# Patient Record
Sex: MALE | Race: BLACK OR AFRICAN AMERICAN | URBAN - METROPOLITAN AREA
[De-identification: names, ages, dates, MRNs, and addresses within clinical notes are randomized per-mention and may not be internally consistent; named-entity substitution may affect disease eponyms.]

---

## 2022-01-19 ENCOUNTER — OFFICE VISIT (OUTPATIENT)
Dept: FAMILY MEDICINE CLINIC | Age: 37
End: 2022-01-19
Payer: COMMERCIAL

## 2022-01-19 VITALS
HEIGHT: 78 IN | SYSTOLIC BLOOD PRESSURE: 121 MMHG | BODY MASS INDEX: 36.45 KG/M2 | HEART RATE: 98 BPM | WEIGHT: 315 LBS | DIASTOLIC BLOOD PRESSURE: 85 MMHG | OXYGEN SATURATION: 98 %

## 2022-01-19 DIAGNOSIS — Z13.29 THYROID DISORDER SCREEN: ICD-10-CM

## 2022-01-19 DIAGNOSIS — E11.9 TYPE 2 DIABETES MELLITUS WITHOUT COMPLICATION, WITHOUT LONG-TERM CURRENT USE OF INSULIN (HCC): Primary | ICD-10-CM

## 2022-01-19 DIAGNOSIS — E66.01 CLASS 3 SEVERE OBESITY WITHOUT SERIOUS COMORBIDITY WITH BODY MASS INDEX (BMI) OF 40.0 TO 44.9 IN ADULT, UNSPECIFIED OBESITY TYPE (HCC): ICD-10-CM

## 2022-01-19 LAB — HBA1C MFR BLD: 13.8 %

## 2022-01-19 PROCEDURE — G8484 FLU IMMUNIZE NO ADMIN: HCPCS | Performed by: FAMILY MEDICINE

## 2022-01-19 PROCEDURE — 99203 OFFICE O/P NEW LOW 30 MIN: CPT | Performed by: FAMILY MEDICINE

## 2022-01-19 PROCEDURE — G8427 DOCREV CUR MEDS BY ELIG CLIN: HCPCS | Performed by: FAMILY MEDICINE

## 2022-01-19 PROCEDURE — G8417 CALC BMI ABV UP PARAM F/U: HCPCS | Performed by: FAMILY MEDICINE

## 2022-01-19 PROCEDURE — 83036 HEMOGLOBIN GLYCOSYLATED A1C: CPT | Performed by: FAMILY MEDICINE

## 2022-01-19 PROCEDURE — 2022F DILAT RTA XM EVC RTNOPTHY: CPT | Performed by: FAMILY MEDICINE

## 2022-01-19 PROCEDURE — 1036F TOBACCO NON-USER: CPT | Performed by: FAMILY MEDICINE

## 2022-01-19 PROCEDURE — 3046F HEMOGLOBIN A1C LEVEL >9.0%: CPT | Performed by: FAMILY MEDICINE

## 2022-01-19 ASSESSMENT — ENCOUNTER SYMPTOMS
SHORTNESS OF BREATH: 0
COUGH: 0
CHEST TIGHTNESS: 0
SORE THROAT: 0
WHEEZING: 0
SINUS PRESSURE: 0
BACK PAIN: 0
ABDOMINAL PAIN: 0
CONSTIPATION: 0
DIARRHEA: 0

## 2022-01-19 ASSESSMENT — PATIENT HEALTH QUESTIONNAIRE - PHQ9
7. TROUBLE CONCENTRATING ON THINGS, SUCH AS READING THE NEWSPAPER OR WATCHING TELEVISION: 0
SUM OF ALL RESPONSES TO PHQ QUESTIONS 1-9: 1
2. FEELING DOWN, DEPRESSED OR HOPELESS: 1
SUM OF ALL RESPONSES TO PHQ QUESTIONS 1-9: 1
5. POOR APPETITE OR OVEREATING: 0
3. TROUBLE FALLING OR STAYING ASLEEP: 0
SUM OF ALL RESPONSES TO PHQ9 QUESTIONS 1 & 2: 1
6. FEELING BAD ABOUT YOURSELF - OR THAT YOU ARE A FAILURE OR HAVE LET YOURSELF OR YOUR FAMILY DOWN: 0
10. IF YOU CHECKED OFF ANY PROBLEMS, HOW DIFFICULT HAVE THESE PROBLEMS MADE IT FOR YOU TO DO YOUR WORK, TAKE CARE OF THINGS AT HOME, OR GET ALONG WITH OTHER PEOPLE: 0
9. THOUGHTS THAT YOU WOULD BE BETTER OFF DEAD, OR OF HURTING YOURSELF: 0
8. MOVING OR SPEAKING SO SLOWLY THAT OTHER PEOPLE COULD HAVE NOTICED. OR THE OPPOSITE, BEING SO FIGETY OR RESTLESS THAT YOU HAVE BEEN MOVING AROUND A LOT MORE THAN USUAL: 0
1. LITTLE INTEREST OR PLEASURE IN DOING THINGS: 0
SUM OF ALL RESPONSES TO PHQ QUESTIONS 1-9: 1
SUM OF ALL RESPONSES TO PHQ QUESTIONS 1-9: 1
4. FEELING TIRED OR HAVING LITTLE ENERGY: 0

## 2022-01-19 NOTE — PROGRESS NOTES
[place]uc Mart  1985  01/19/22    Chief Complaint   Patient presents with    New Patient     Patient here to establish care with PCP           43 yrs old man, with non PMH except new diagnosed with DM, and was started on metformin since last October went to the ED for abscess. Patient never been on medication in the past,  in his chart his A1C 12.4 since 2017, patient didn't know and never told he is diabetic, doing fine and feeling good, no smoking, no alcohols. History reviewed. No pertinent past medical history. History reviewed. No pertinent surgical history. Family History   Problem Relation Age of Onset    Diabetes Maternal Grandmother      Social History     Socioeconomic History    Marital status: Single     Spouse name: Not on file    Number of children: Not on file    Years of education: Not on file    Highest education level: Not on file   Occupational History    Not on file   Tobacco Use    Smoking status: Never Smoker    Smokeless tobacco: Never Used   Substance and Sexual Activity    Alcohol use: Never    Drug use: Never    Sexual activity: Not on file   Other Topics Concern    Not on file   Social History Narrative    Not on file     Social Determinants of Health     Financial Resource Strain:     Difficulty of Paying Living Expenses: Not on file   Food Insecurity:     Worried About Running Out of Food in the Last Year: Not on file    Sonu of Food in the Last Year: Not on file   Transportation Needs:     Lack of Transportation (Medical): Not on file    Lack of Transportation (Non-Medical):  Not on file   Physical Activity:     Days of Exercise per Week: Not on file    Minutes of Exercise per Session: Not on file   Stress:     Feeling of Stress : Not on file   Social Connections:     Frequency of Communication with Friends and Family: Not on file    Frequency of Social Gatherings with Friends and Family: Not on file    Attends Protestant Services: Not on file   Dean Active Member of Clubs or Organizations: Not on file    Attends Club or Organization Meetings: Not on file    Marital Status: Not on file   Intimate Partner Violence:     Fear of Current or Ex-Partner: Not on file    Emotionally Abused: Not on file    Physically Abused: Not on file    Sexually Abused: Not on file   Housing Stability:     Unable to Pay for Housing in the Last Year: Not on file    Number of [place] in the Last Year: Not on file    Unstable Housing in the Last Year: Not on file       Allergies   Allergen Reactions    Bactrim [Sulfamethoxazole-Trimethoprim] Rash     Current Outpatient Medications   Medication Sig Dispense Refill    metFORMIN (GLUCOPHAGE) 500 MG tablet Take 2 tablets by mouth 2 times daily (with meals) 60 tablet 3     No current facility-administered medications for this visit. Review of Systems   Constitutional: Negative for activity change, appetite change, chills, fatigue and fever. HENT: Negative for congestion, postnasal drip, sinus pressure and sore throat. Respiratory: Negative for cough, chest tightness, shortness of breath and wheezing. Cardiovascular: Negative for chest pain and leg swelling. Gastrointestinal: Negative for abdominal pain, constipation and diarrhea. Genitourinary: Negative for dysuria and frequency. Musculoskeletal: Negative for back pain. Skin: Negative for rash. Neurological: Negative for dizziness, weakness and headaches. Psychiatric/Behavioral: Negative for agitation, behavioral problems and sleep disturbance. The patient is not nervous/anxious.           /85 (Site: Left Upper Arm, Position: Sitting, Cuff Size: Large Adult)   Pulse 98   Ht 6' 8\" (2.032 m)   Wt (!) 397 lb 9.6 oz (180.4 kg)   SpO2 98%   BMI 43.68 kg/m²     BP Readings from Last 3 Encounters:   01/19/22 121/85       Wt Readings from Last 3 Encounters:   01/19/22 (!) 397 lb 9.6 oz (180.4 kg)         Physical Exam  Constitutional:       General: He is not in acute distress. Appearance: Normal appearance. He is well-developed. He is obese. He is not diaphoretic. HENT:      Head: Normocephalic and atraumatic. Eyes:      General: No scleral icterus. Cardiovascular:      Rate and Rhythm: Normal rate and regular rhythm. Heart sounds: Normal heart sounds. No murmur heard. Pulmonary:      Effort: Pulmonary effort is normal.      Breath sounds: Normal breath sounds. No wheezing. Musculoskeletal:         General: Normal range of motion. Cervical back: Normal range of motion and neck supple. No rigidity. Right lower leg: No edema. Left lower leg: No edema. Neurological:      General: No focal deficit present. Mental Status: He is alert and oriented to person, place, and time. Cranial Nerves: No cranial nerve deficit. Psychiatric:         Mood and Affect: Mood normal.         Behavior: Behavior normal.         Thought Content: Thought content normal.         Judgment: Judgment normal.         ASSESSMENT/ PLAN:    1. Type 2 diabetes mellitus without complication, without long-term current use of insulin ([facility])  - CBC Auto Differential; Future  - Comprehensive Metabolic Panel, Fasting; Future  - Vitamin B12 & Folate; Future  - [facility] (Ambulatory)  - Lipid Panel; Future  - metFORMIN (GLUCOPHAGE) 500 MG tablet; Take 2 tablets by mouth 2 times daily (with meals)  Dispense: 60 tablet; Refill: 3  - POCT glycosylated hemoglobin (Hb A1C)  - will give the patient just one month to see how the medication and watching diet will affect him, so increase the metformin to 1000mg bid    2. Thyroid disorder screen  - T4, Free; Future  - TSH without Reflex; Future    3.  Class 3 severe obesity without serious comorbidity with body mass index (BMI) of 40.0 to 44.9 in adult, unspecified obesity type ([address]  - encourage wt loss              - All old blood work reviewed with the patient  - Appropriate prescription are addressed. - After visit summery provided. - Questions answered and patient verbalizes understanding.  - Call for any problem, questions, or concerns. Return in about 1 month (around 2/19/2022).

## 2022-01-24 DIAGNOSIS — Z13.29 THYROID DISORDER SCREEN: ICD-10-CM

## 2022-01-24 DIAGNOSIS — E11.9 TYPE 2 DIABETES MELLITUS WITHOUT COMPLICATION, WITHOUT LONG-TERM CURRENT USE OF INSULIN (HCC): ICD-10-CM

## 2022-01-24 LAB
A/G RATIO: 1.7 (ref 1.1–2.2)
ALBUMIN SERPL-MCNC: 4.5 G/DL (ref 3.4–5)
ALP BLD-CCNC: 69 U/L (ref 40–129)
ALT SERPL-CCNC: 54 U/L (ref 10–40)
ANION GAP SERPL CALCULATED.3IONS-SCNC: 13 MMOL/L (ref 3–16)
AST SERPL-CCNC: 24 U/L (ref 15–37)
BASOPHILS ABSOLUTE: 0 K/UL (ref 0–0.2)
BASOPHILS RELATIVE PERCENT: 0.4 %
BILIRUB SERPL-MCNC: 1.4 MG/DL (ref 0–1)
BUN BLDV-MCNC: 10 MG/DL (ref 7–20)
CALCIUM SERPL-MCNC: 9.8 MG/DL (ref 8.3–10.6)
CHLORIDE BLD-SCNC: 95 MMOL/L (ref 99–110)
CHOLESTEROL, TOTAL: 169 MG/DL (ref 0–199)
CO2: 26 MMOL/L (ref 21–32)
CREAT SERPL-MCNC: 0.7 MG/DL (ref 0.9–1.3)
EOSINOPHILS ABSOLUTE: 0 K/UL (ref 0–0.6)
EOSINOPHILS RELATIVE PERCENT: 0.7 %
FOLATE: 13.48 NG/ML (ref 4.78–24.2)
GFR AFRICAN AMERICAN: >60
GFR NON-AFRICAN AMERICAN: >60
GLUCOSE FASTING: 338 MG/DL (ref 70–99)
HCT VFR BLD CALC: 43.5 % (ref 40.5–52.5)
HDLC SERPL-MCNC: 40 MG/DL (ref 40–60)
HEMOGLOBIN: 14.3 G/DL (ref 13.5–17.5)
LDL CHOLESTEROL CALCULATED: 91 MG/DL
LYMPHOCYTES ABSOLUTE: 2.2 K/UL (ref 1–5.1)
LYMPHOCYTES RELATIVE PERCENT: 45.3 %
MCH RBC QN AUTO: 28.1 PG (ref 26–34)
MCHC RBC AUTO-ENTMCNC: 32.9 G/DL (ref 31–36)
MCV RBC AUTO: 85.2 FL (ref 80–100)
MONOCYTES ABSOLUTE: 0.3 K/UL (ref 0–1.3)
MONOCYTES RELATIVE PERCENT: 5.9 %
NEUTROPHILS ABSOLUTE: 2.3 K/UL (ref 1.7–7.7)
NEUTROPHILS RELATIVE PERCENT: 47.7 %
PDW BLD-RTO: 13 % (ref 12.4–15.4)
PLATELET # BLD: 166 K/UL (ref 135–450)
PMV BLD AUTO: 10.1 FL (ref 5–10.5)
POTASSIUM SERPL-SCNC: 4.1 MMOL/L (ref 3.5–5.1)
RBC # BLD: 5.1 M/UL (ref 4.2–5.9)
SODIUM BLD-SCNC: 134 MMOL/L (ref 136–145)
T4 FREE: 1.3 NG/DL (ref 0.9–1.8)
TOTAL PROTEIN: 7.2 G/DL (ref 6.4–8.2)
TRIGL SERPL-MCNC: 191 MG/DL (ref 0–150)
TSH SERPL DL<=0.05 MIU/L-ACNC: 1.73 UIU/ML (ref 0.27–4.2)
VITAMIN B-12: 1096 PG/ML (ref 211–911)
VLDLC SERPL CALC-MCNC: 38 MG/DL
WBC # BLD: 4.9 K/UL (ref 4–11)

## 2022-02-23 ENCOUNTER — OFFICE VISIT (OUTPATIENT)
Dept: FAMILY MEDICINE CLINIC | Age: 37
End: 2022-02-23
Payer: COMMERCIAL

## 2022-02-23 VITALS
DIASTOLIC BLOOD PRESSURE: 88 MMHG | HEART RATE: 86 BPM | WEIGHT: 315 LBS | BODY MASS INDEX: 44.54 KG/M2 | OXYGEN SATURATION: 98 % | SYSTOLIC BLOOD PRESSURE: 128 MMHG

## 2022-02-23 DIAGNOSIS — E66.01 CLASS 3 SEVERE OBESITY WITHOUT SERIOUS COMORBIDITY WITH BODY MASS INDEX (BMI) OF 40.0 TO 44.9 IN ADULT, UNSPECIFIED OBESITY TYPE (HCC): ICD-10-CM

## 2022-02-23 DIAGNOSIS — E11.9 TYPE 2 DIABETES MELLITUS WITHOUT COMPLICATION, WITHOUT LONG-TERM CURRENT USE OF INSULIN (HCC): Primary | ICD-10-CM

## 2022-02-23 DIAGNOSIS — R17 ELEVATED BILIRUBIN: ICD-10-CM

## 2022-02-23 LAB — HBA1C MFR BLD: 11.2 %

## 2022-02-23 PROCEDURE — G8427 DOCREV CUR MEDS BY ELIG CLIN: HCPCS | Performed by: FAMILY MEDICINE

## 2022-02-23 PROCEDURE — 2022F DILAT RTA XM EVC RTNOPTHY: CPT | Performed by: FAMILY MEDICINE

## 2022-02-23 PROCEDURE — 99214 OFFICE O/P EST MOD 30 MIN: CPT | Performed by: FAMILY MEDICINE

## 2022-02-23 PROCEDURE — 83036 HEMOGLOBIN GLYCOSYLATED A1C: CPT | Performed by: FAMILY MEDICINE

## 2022-02-23 PROCEDURE — G8417 CALC BMI ABV UP PARAM F/U: HCPCS | Performed by: FAMILY MEDICINE

## 2022-02-23 PROCEDURE — 3046F HEMOGLOBIN A1C LEVEL >9.0%: CPT | Performed by: FAMILY MEDICINE

## 2022-02-23 PROCEDURE — 1036F TOBACCO NON-USER: CPT | Performed by: FAMILY MEDICINE

## 2022-02-23 PROCEDURE — G8484 FLU IMMUNIZE NO ADMIN: HCPCS | Performed by: FAMILY MEDICINE

## 2022-02-23 RX ORDER — LANCETS 30 GAUGE
EACH MISCELLANEOUS
Qty: 50 EACH | Refills: 5 | Status: SHIPPED | OUTPATIENT
Start: 2022-02-23

## 2022-02-23 RX ORDER — GLUCOSAMINE HCL/CHONDROITIN SU 500-400 MG
CAPSULE ORAL
Qty: 100 STRIP | Refills: 5 | Status: SHIPPED | OUTPATIENT
Start: 2022-02-23

## 2022-02-23 ASSESSMENT — ENCOUNTER SYMPTOMS
DIARRHEA: 0
NAUSEA: 0
ABDOMINAL PAIN: 0
CONSTIPATION: 0
BACK PAIN: 0
VOMITING: 0
SHORTNESS OF BREATH: 0
COUGH: 0

## 2022-02-23 NOTE — PROGRESS NOTES
Intimate Partner Violence:     Fear of Current or Ex-Partner: Not on file    Emotionally Abused: Not on file    Physically Abused: Not on file    Sexually Abused: Not on file   Housing Stability:     Unable to Pay for Housing in the Last Year: Not on file    Number of [place] in the Last Year: Not on file    Unstable Housing in the Last Year: Not on file       Allergies   Allergen Reactions    Bactrim [Sulfamethoxazole-Trimethoprim] Rash     Current Outpatient Medications   Medication Sig Dispense Refill    blood glucose monitor kit and supplies Use 3-4 times daily 1 kit 0    blood glucose monitor strips Test 2-3 times a day & as needed for symptoms of irregular blood glucose. 100 strip 5    Lancets MISC Use one lancet 2  times daily 50 each 5    metFORMIN (GLUCOPHAGE) 500 MG tablet Take 2 tablets by mouth 2 times daily (with meals) 60 tablet 3     No current facility-administered medications for this visit. Review of Systems   Constitutional: Negative for activity change, appetite change, chills, fatigue and fever. HENT: Negative for congestion. Respiratory: Negative for cough and shortness of breath. Cardiovascular: Negative for chest pain and leg swelling. Gastrointestinal: Negative for abdominal pain, constipation, diarrhea, nausea and vomiting. Genitourinary: Negative for dysuria. Musculoskeletal: Negative for back pain. Neurological: Negative for dizziness and headaches. Psychiatric/Behavioral: Negative for agitation and sleep disturbance. The patient is not nervous/anxious.         Lab Results   Component Value Date    WBC 4.9 01/24/2022    HGB 14.3 01/24/2022    HCT 43.5 01/24/2022    MCV 85.2 01/24/2022     01/24/2022     Lab Results   Component Value Date     (L) 01/24/2022    K 4.1 01/24/2022    CL 95 (L) 01/24/2022    CO2 26 01/24/2022    BUN 10 01/24/2022    CREATININE 0.7 (L) 01/24/2022    CALCIUM 9.8 01/24/2022    PROT 7.2 01/24/2022    LABALBU 4.5 01/24/2022    BILITOT 1.4 (H) 01/24/2022    ALKPHOS 69 01/24/2022    AST 24 01/24/2022    ALT 54 (H) 01/24/2022    LABGLOM >60 01/24/2022    GFRAA >60 01/24/2022    AGRATIO 1.7 01/24/2022     Lab Results   Component Value Date    CHOL 169 01/24/2022     Lab Results   Component Value Date    TRIG 191 (H) 01/24/2022     Lab Results   Component Value Date    HDL 40 01/24/2022     Lab Results   Component Value Date    LDLCALC 91 01/24/2022     Lab Results   Component Value Date    LABA1C 13.8 01/19/2022     Lab Results   Component Value Date    TSH 1.73 01/24/2022         /88 (Site: Left Upper Arm, Position: Sitting, Cuff Size: Large Adult)   Pulse 86   Wt (!) 405 lb 6.4 oz (183.9 kg)   SpO2 98%   BMI 44.54 kg/m²     BP Readings from Last 3 Encounters:   02/23/22 128/88   01/19/22 121/85       Wt Readings from Last 3 Encounters:   02/23/22 (!) 405 lb 6.4 oz (183.9 kg)   01/19/22 (!) 397 lb 9.6 oz (180.4 kg)         Physical Exam  Constitutional:       General: He is not in acute distress. Appearance: Normal appearance. He is well-developed. He is obese. He is not diaphoretic. HENT:      Head: Normocephalic and atraumatic. Eyes:      General: No scleral icterus. Cardiovascular:      Rate and Rhythm: Normal rate and regular rhythm. Heart sounds: Normal heart sounds. No murmur heard. Pulmonary:      Effort: Pulmonary effort is normal.      Breath sounds: Normal breath sounds. No wheezing. Musculoskeletal:         General: Normal range of motion. Cervical back: Normal range of motion and neck supple. No rigidity. Right lower leg: No edema. Left lower leg: No edema. Neurological:      General: No focal deficit present. Mental Status: He is alert and oriented to person, place, and time. Cranial Nerves: No cranial nerve deficit. Psychiatric:         Mood and Affect: Mood normal.         Behavior: Behavior normal.         ASSESSMENT/ PLAN:    1.  Type 2 diabetes mellitus without complication, without long-term current use of insulin ([facility]  - his A1C went down from 13.8 to 11.2 with just with the metformin, so will keep the same dose , continue watching diet and recommend exercise. - refuse diabetic education at this time  - blood glucose monitor kit and supplies; Use 3-4 times daily  Dispense: 1 kit; Refill: 0  - blood glucose monitor strips; Test 2-3 times a day & as needed for symptoms of irregular blood glucose. Dispense: 100 strip; Refill: 5  - Lancets MISC; Use one lancet 2  times daily  Dispense: 50 each; Refill: 5  - POCT glycosylated hemoglobin (Hb A1C)    2. Class 3 severe obesity without serious comorbidity with body mass index (BMI) of 40.0 to 44.9 in adult, unspecified obesity type ([facility]  [facility]    3. Elevated bilirubin, will recheck next visit   no previous labs          - All old blood work reviewed with the patient  - Appropriate prescription are addressed. - After visit thierry provided. - Questions answered and patient verbalizes understanding.  - Call for any problem, questions, or concerns. Return in about 2 months (around 4/23/2022).

## 2022-02-24 ENCOUNTER — TELEPHONE (OUTPATIENT)
Dept: BARIATRICS/WEIGHT MGMT | Age: 37
End: 2022-02-24

## 2022-02-24 NOTE — TELEPHONE ENCOUNTER
Called patient for wm referral. Patient will call back.  Verified with [facility] no coverage for bariatrics

## 2022-04-27 ENCOUNTER — OFFICE VISIT (OUTPATIENT)
Dept: FAMILY MEDICINE CLINIC | Age: 37
End: 2022-04-27
Payer: COMMERCIAL

## 2022-04-27 VITALS
SYSTOLIC BLOOD PRESSURE: 124 MMHG | BODY MASS INDEX: 43.63 KG/M2 | HEART RATE: 94 BPM | WEIGHT: 315 LBS | DIASTOLIC BLOOD PRESSURE: 88 MMHG | OXYGEN SATURATION: 97 %

## 2022-04-27 DIAGNOSIS — E11.9 TYPE 2 DIABETES MELLITUS WITHOUT COMPLICATION, WITHOUT LONG-TERM CURRENT USE OF INSULIN (HCC): Primary | ICD-10-CM

## 2022-04-27 LAB — HBA1C MFR BLD: 13.2 %

## 2022-04-27 PROCEDURE — 2022F DILAT RTA XM EVC RTNOPTHY: CPT | Performed by: FAMILY MEDICINE

## 2022-04-27 PROCEDURE — 1036F TOBACCO NON-USER: CPT | Performed by: FAMILY MEDICINE

## 2022-04-27 PROCEDURE — G8417 CALC BMI ABV UP PARAM F/U: HCPCS | Performed by: FAMILY MEDICINE

## 2022-04-27 PROCEDURE — 83036 HEMOGLOBIN GLYCOSYLATED A1C: CPT | Performed by: FAMILY MEDICINE

## 2022-04-27 PROCEDURE — 3046F HEMOGLOBIN A1C LEVEL >9.0%: CPT | Performed by: FAMILY MEDICINE

## 2022-04-27 PROCEDURE — 99214 OFFICE O/P EST MOD 30 MIN: CPT | Performed by: FAMILY MEDICINE

## 2022-04-27 PROCEDURE — G8427 DOCREV CUR MEDS BY ELIG CLIN: HCPCS | Performed by: FAMILY MEDICINE

## 2022-04-27 RX ORDER — METFORMIN HYDROCHLORIDE 500 MG/1
1000 TABLET, EXTENDED RELEASE ORAL 2 TIMES DAILY
Qty: 120 TABLET | Refills: 5 | Status: SHIPPED | OUTPATIENT
Start: 2022-04-27 | End: 2022-10-26 | Stop reason: SDUPTHER

## 2022-04-27 RX ORDER — ORAL SEMAGLUTIDE 3 MG/1
3 TABLET ORAL DAILY
Qty: 30 TABLET | Refills: 5 | Status: SHIPPED | OUTPATIENT
Start: 2022-04-27 | End: 2022-05-24

## 2022-04-27 ASSESSMENT — ENCOUNTER SYMPTOMS
BACK PAIN: 0
COUGH: 0
SHORTNESS OF BREATH: 0

## 2022-04-27 NOTE — PROGRESS NOTES
Kemar Wolfe  1985  04/27/22    Chief Complaint   Patient presents with    Other     Patient here for 2 month F/U for diabetes           Patient here for 2 months f/u regarding DM, last visit the A1C went down from 13.8 to 11.2, patient doing fine , and lost wt, feeling better, he is taking the 500mg metformin 2 tabs bid. History reviewed. No pertinent past medical history. History reviewed. No pertinent surgical history. Family History   Problem Relation Age of Onset    Diabetes Maternal Grandmother      Social History     Socioeconomic History    Marital status: Single     Spouse name: Not on file    Number of children: Not on file    Years of education: Not on file    Highest education level: Not on file   Occupational History    Not on file   Tobacco Use    Smoking status: Never Smoker    Smokeless tobacco: Never Used   Substance and Sexual Activity    Alcohol use: Never    Drug use: Never    Sexual activity: Not on file   Other Topics Concern    Not on file   Social History Narrative    Not on file     Social Determinants of Health     Financial Resource Strain:     Difficulty of Paying Living Expenses: Not on file   Food Insecurity:     Worried About Running Out of Food in the Last Year: Not on file    Sonu of Food in the Last Year: Not on file   Transportation Needs:     Lack of Transportation (Medical): Not on file    Lack of Transportation (Non-Medical):  Not on file   Physical Activity:     Days of Exercise per Week: Not on file    Minutes of Exercise per Session: Not on file   Stress:     Feeling of Stress : Not on file   Social Connections:     Frequency of Communication with Friends and Family: Not on file    Frequency of Social Gatherings with Friends and Family: Not on file    Attends Bahai Services: Not on file    Active Member of Clubs or Organizations: Not on file    Attends Club or Organization Meetings: Not on file    Marital Status: Not on file Intimate Partner Violence:     Fear of Current or Ex-Partner: Not on file    Emotionally Abused: Not on file    Physically Abused: Not on file    Sexually Abused: Not on file   Housing Stability:     Unable to Pay for Housing in the Last Year: Not on file    Number of [place] in the Last Year: Not on file    Unstable Housing in the Last Year: Not on file       Allergies   Allergen Reactions    Bactrim [Sulfamethoxazole-Trimethoprim] Rash     Current Outpatient Medications   Medication Sig Dispense Refill    Semaglutide (RYBELSUS) 3 MG TABS Take 3 mg by mouth daily 30 tablet 5    metFORMIN (GLUCOPHAGE-XR) 500 MG extended release tablet Take 2 tablets by mouth 2 times daily 120 tablet 5    blood glucose monitor kit and supplies Use 3-4 times daily 1 kit 0    blood glucose monitor strips Test 2-3 times a day & as needed for symptoms of irregular blood glucose. 100 strip 5    Lancets MISC Use one lancet 2  times daily 50 each 5     No current facility-administered medications for this visit. Review of Systems   Constitutional: Negative for activity change, appetite change, chills, fatigue and fever. HENT: Negative for congestion. Respiratory: Negative for cough and shortness of breath. Cardiovascular: Negative for chest pain and leg swelling. Genitourinary: Negative for dysuria. Musculoskeletal: Negative for back pain. Neurological: Negative for dizziness and headaches. Psychiatric/Behavioral: Negative for agitation and sleep disturbance. The patient is not nervous/anxious.         Lab Results   Component Value Date    WBC 4.9 01/24/2022    HGB 14.3 01/24/2022    HCT 43.5 01/24/2022    MCV 85.2 01/24/2022     01/24/2022     Lab Results   Component Value Date     (L) 01/24/2022    K 4.1 01/24/2022    CL 95 (L) 01/24/2022    CO2 26 01/24/2022    BUN 10 01/24/2022    CREATININE 0.7 (L) 01/24/2022    CALCIUM 9.8 01/24/2022    PROT 7.2 01/24/2022    LABALBU 4.5 01/24/2022 BILITOT 1.4 (H) 01/24/2022    ALKPHOS 69 01/24/2022    AST 24 01/24/2022    ALT 54 (H) 01/24/2022    LABGLOM >60 01/24/2022    GFRAA >60 01/24/2022    AGRATIO 1.7 01/24/2022     Lab Results   Component Value Date    CHOL 169 01/24/2022     Lab Results   Component Value Date    TRIG 191 (H) 01/24/2022     Lab Results   Component Value Date    HDL 40 01/24/2022     Lab Results   Component Value Date    LDLCALC 91 01/24/2022     Lab Results   Component Value Date    LABA1C 13.2 04/27/2022     Lab Results   Component Value Date    TSH 1.73 01/24/2022         /88 (Site: Left Upper Arm, Position: Sitting, Cuff Size: Large Adult)   Pulse 94   Wt (!) 397 lb 3.2 oz (180.2 kg)   SpO2 97%   BMI 43.63 kg/m²     BP Readings from Last 3 Encounters:   04/27/22 124/88   02/23/22 128/88   01/19/22 121/85       Wt Readings from Last 3 Encounters:   04/27/22 (!) 397 lb 3.2 oz (180.2 kg)   02/23/22 (!) 405 lb 6.4 oz (183.9 kg)   01/19/22 (!) 397 lb 9.6 oz (180.4 kg)         Physical Exam  Constitutional:       General: He is not in acute distress. Appearance: Normal appearance. He is well-developed. He is obese. He is not diaphoretic. HENT:      Head: Normocephalic and atraumatic. Eyes:      General: No scleral icterus. Cardiovascular:      Rate and Rhythm: Normal rate and regular rhythm. Heart sounds: Normal heart sounds. No murmur heard. Pulmonary:      Effort: Pulmonary effort is normal.      Breath sounds: Normal breath sounds. No wheezing. Musculoskeletal:         General: Normal range of motion. Cervical back: Normal range of motion and neck supple. No rigidity. Right lower leg: No edema. Left lower leg: No edema. Neurological:      General: No focal deficit present. Mental Status: He is alert and oriented to person, place, and time. Cranial Nerves: No cranial nerve deficit.    Psychiatric:         Mood and Affect: Mood normal.         Behavior: Behavior normal. ASSESSMENT/ PLAN:    1. Type 2 diabetes mellitus without complication, without long-term current use of insulin (H[facility])  - POCT glycosylated hemoglobin (Hb A1C), went up, so add :  - Semaglutide (RYBELSUS) 3 MG TABS; Take 3 mg by mouth daily  Dispense: 30 tablet; Refill: 5  - metFORMIN (GLUCOPHAGE-XR) 500 MG extended release tablet; Take 2 tablets by mouth 2 times daily  Dispense: 120 tablet; Refill: 5  - also we change the metformin to long acting one            - All old blood work reviewed with the patient  - Appropriate prescription are addressed. - After visit summery provided. - Questions answered and patient verbalizes understanding.  - Call for any problem, questions, or concerns. Return in about 1 month (around 5/27/2022).

## 2022-05-24 ENCOUNTER — TELEPHONE (OUTPATIENT)
Dept: FAMILY MEDICINE CLINIC | Age: 37
End: 2022-05-24

## 2022-05-24 RX ORDER — GLIMEPIRIDE 2 MG/1
2 TABLET ORAL
Qty: 30 TABLET | Refills: 1 | Status: ON HOLD | OUTPATIENT
Start: 2022-05-24 | End: 2022-07-01 | Stop reason: HOSPADM

## 2022-05-24 NOTE — TELEPHONE ENCOUNTER
Patient called and stated that his insurance will not cover the Rybelsus is there something else that can be called in?  Please advise

## 2022-05-24 NOTE — TELEPHONE ENCOUNTER
History noted and chart reviewed. Patient's PCP is unavailable for several weeks. Last visit was in April and hemoglobin A1c then was 13.2. Rybelsus was ordered but apparently patient was never able to pick it up. Rybelsus was discontinued in the chart. I ordered [place]. There is no data coming back from epic about coverage information for this patient's insurance. The prescription sent to [facility] was noted to call if not covered. Patient should try to go  the new medication and be rescheduled in about 1 month for follow-up on this new medication. If it is not covered he needs to call us immediately as the likely neck step will be to put him on insulin for diabetic control.

## 2022-05-24 NOTE — TELEPHONE ENCOUNTER
We will try some glimepiride until we can see him in the office and decide if that is helpful. He is rescheduled for a month from now.

## 2022-06-27 ENCOUNTER — HOSPITAL ENCOUNTER (INPATIENT)
Age: 37
LOS: 4 days | Discharge: HOME | DRG: 617 | End: 2022-07-01
Attending: EMERGENCY MEDICINE | Admitting: HOSPITALIST
Payer: COMMERCIAL

## 2022-06-27 ENCOUNTER — APPOINTMENT (OUTPATIENT)
Dept: GENERAL RADIOLOGY | Age: 37
DRG: 617 | End: 2022-06-27
Payer: COMMERCIAL

## 2022-06-27 ENCOUNTER — OFFICE VISIT (OUTPATIENT)
Dept: FAMILY MEDICINE CLINIC | Age: 37
End: 2022-06-27
Payer: COMMERCIAL

## 2022-06-27 VITALS
OXYGEN SATURATION: 97 % | WEIGHT: 315 LBS | HEART RATE: 94 BPM | SYSTOLIC BLOOD PRESSURE: 138 MMHG | BODY MASS INDEX: 44.1 KG/M2 | DIASTOLIC BLOOD PRESSURE: 86 MMHG

## 2022-06-27 DIAGNOSIS — M86.9 OSTEOMYELITIS OF TOE OF RIGHT FOOT (HCC): Primary | ICD-10-CM

## 2022-06-27 DIAGNOSIS — E11.628 DIABETIC INFECTION OF RIGHT FOOT (HCC): ICD-10-CM

## 2022-06-27 DIAGNOSIS — L03.031 CELLULITIS OF TOE OF RIGHT FOOT: ICD-10-CM

## 2022-06-27 DIAGNOSIS — I96 GANGRENE (HCC): ICD-10-CM

## 2022-06-27 DIAGNOSIS — L08.9 DIABETIC INFECTION OF RIGHT FOOT (HCC): ICD-10-CM

## 2022-06-27 DIAGNOSIS — E11.65 UNCONTROLLED TYPE 2 DIABETES MELLITUS WITH HYPERGLYCEMIA (HCC): Primary | ICD-10-CM

## 2022-06-27 PROBLEM — M86.10 ACUTE OSTEOMYELITIS (HCC): Status: ACTIVE | Noted: 2022-06-27

## 2022-06-27 LAB
ALBUMIN SERPL-MCNC: 4.1 GM/DL (ref 3.4–5)
ALP BLD-CCNC: 60 IU/L (ref 40–129)
ALT SERPL-CCNC: 48 U/L (ref 10–40)
ANION GAP SERPL CALCULATED.3IONS-SCNC: 9 MMOL/L (ref 4–16)
AST SERPL-CCNC: 27 IU/L (ref 15–37)
BACTERIA: ABNORMAL /HPF
BACTERIA: NEGATIVE /HPF
BASOPHILS ABSOLUTE: 0 K/CU MM
BASOPHILS RELATIVE PERCENT: 0.4 % (ref 0–1)
BILIRUB SERPL-MCNC: 0.6 MG/DL (ref 0–1)
BILIRUBIN URINE: NEGATIVE MG/DL
BILIRUBIN URINE: NEGATIVE MG/DL
BLOOD, URINE: NEGATIVE
BLOOD, URINE: NEGATIVE
BUN BLDV-MCNC: 12 MG/DL (ref 6–23)
CALCIUM SERPL-MCNC: 9.2 MG/DL (ref 8.3–10.6)
CHLORIDE BLD-SCNC: 100 MMOL/L (ref 99–110)
CLARITY: CLEAR
CLARITY: CLEAR
CO2: 26 MMOL/L (ref 21–32)
COLOR: COLORLESS
COLOR: YELLOW
CREAT SERPL-MCNC: 0.7 MG/DL (ref 0.9–1.3)
CREATININE URINE POCT: ABNORMAL
DIFFERENTIAL TYPE: ABNORMAL
EOSINOPHILS ABSOLUTE: 0 K/CU MM
EOSINOPHILS RELATIVE PERCENT: 0.6 % (ref 0–3)
ERYTHROCYTE SEDIMENTATION RATE: 41 MM/HR (ref 0–15)
GFR AFRICAN AMERICAN: >60 ML/MIN/1.73M2
GFR NON-AFRICAN AMERICAN: >60 ML/MIN/1.73M2
GLUCOSE BLD-MCNC: 157 MG/DL (ref 70–99)
GLUCOSE BLD-MCNC: 195 MG/DL (ref 70–99)
GLUCOSE BLD-MCNC: 237 MG/DL (ref 70–99)
GLUCOSE BLD-MCNC: 244 MG/DL (ref 70–99)
GLUCOSE, URINE: 250 MG/DL
GLUCOSE, URINE: NEGATIVE MG/DL
HBA1C MFR BLD: 9.9 %
HCT VFR BLD CALC: 43.4 % (ref 42–52)
HEMOGLOBIN: 13.9 GM/DL (ref 13.5–18)
HIGH SENSITIVE C-REACTIVE PROTEIN: 2.8 MG/L
IMMATURE NEUTROPHIL %: 0.2 % (ref 0–0.43)
KETONES, URINE: NEGATIVE MG/DL
KETONES, URINE: NEGATIVE MG/DL
LACTIC ACID, SEPSIS: 1.3 MMOL/L (ref 0.5–1.9)
LEUKOCYTE ESTERASE, URINE: NEGATIVE
LEUKOCYTE ESTERASE, URINE: NEGATIVE
LYMPHOCYTES ABSOLUTE: 2.1 K/CU MM
LYMPHOCYTES RELATIVE PERCENT: 40.4 % (ref 24–44)
MCH RBC QN AUTO: 28.8 PG (ref 27–31)
MCHC RBC AUTO-ENTMCNC: 32 % (ref 32–36)
MCV RBC AUTO: 89.9 FL (ref 78–100)
MICROALBUMIN/CREAT 24H UR: ABNORMAL MG/G{CREAT}
MICROALBUMIN/CREAT UR-RTO: ABNORMAL
MONOCYTES ABSOLUTE: 0.4 K/CU MM
MONOCYTES RELATIVE PERCENT: 7.5 % (ref 0–4)
MUCUS: ABNORMAL HPF
NITRITE URINE, QUANTITATIVE: NEGATIVE
NITRITE URINE, QUANTITATIVE: NEGATIVE
NUCLEATED RBC %: 0 %
PDW BLD-RTO: 11.9 % (ref 11.7–14.9)
PH, URINE: 5.5 (ref 5–8)
PH, URINE: 6 (ref 5–8)
PLATELET # BLD: 166 K/CU MM (ref 140–440)
PMV BLD AUTO: 12.4 FL (ref 7.5–11.1)
POTASSIUM SERPL-SCNC: 4.3 MMOL/L (ref 3.5–5.1)
PROCALCITONIN: 0.04
PROTEIN UA: NEGATIVE MG/DL
PROTEIN UA: NEGATIVE MG/DL
RBC # BLD: 4.83 M/CU MM (ref 4.6–6.2)
RBC URINE: ABNORMAL /HPF (ref 0–3)
RBC URINE: ABNORMAL /HPF (ref 0–3)
SEGMENTED NEUTROPHILS ABSOLUTE COUNT: 2.7 K/CU MM
SEGMENTED NEUTROPHILS RELATIVE PERCENT: 50.9 % (ref 36–66)
SODIUM BLD-SCNC: 135 MMOL/L (ref 135–145)
SPECIFIC GRAVITY UA: 1.01 (ref 1–1.03)
SPECIFIC GRAVITY UA: <1.005 (ref 1–1.03)
SQUAMOUS EPITHELIAL: <1 /HPF
TOTAL IMMATURE NEUTOROPHIL: 0.01 K/CU MM
TOTAL NUCLEATED RBC: 0 K/CU MM
TOTAL PROTEIN: 7.3 GM/DL (ref 6.4–8.2)
TOTAL RETICULOCYTE COUNT: 0.12 K/CU MM
TRICHOMONAS: ABNORMAL /HPF
TRICHOMONAS: ABNORMAL /HPF
UROBILINOGEN, URINE: 0.2 MG/DL (ref 0.2–1)
UROBILINOGEN, URINE: 0.2 MG/DL (ref 0.2–1)
WBC # BLD: 5.2 K/CU MM (ref 4–10.5)
WBC UA: 1 /HPF (ref 0–2)
WBC UA: <1 /HPF (ref 0–2)

## 2022-06-27 PROCEDURE — 2580000003 HC RX 258: Performed by: NURSE PRACTITIONER

## 2022-06-27 PROCEDURE — 3046F HEMOGLOBIN A1C LEVEL >9.0%: CPT | Performed by: FAMILY MEDICINE

## 2022-06-27 PROCEDURE — 6370000000 HC RX 637 (ALT 250 FOR IP): Performed by: NURSE PRACTITIONER

## 2022-06-27 PROCEDURE — 1036F TOBACCO NON-USER: CPT | Performed by: FAMILY MEDICINE

## 2022-06-27 PROCEDURE — 85025 COMPLETE CBC W/AUTO DIFF WBC: CPT

## 2022-06-27 PROCEDURE — G8427 DOCREV CUR MEDS BY ELIG CLIN: HCPCS | Performed by: FAMILY MEDICINE

## 2022-06-27 PROCEDURE — 81001 URINALYSIS AUTO W/SCOPE: CPT

## 2022-06-27 PROCEDURE — 80053 COMPREHEN METABOLIC PANEL: CPT

## 2022-06-27 PROCEDURE — 99285 EMERGENCY DEPT VISIT HI MDM: CPT

## 2022-06-27 PROCEDURE — 6360000002 HC RX W HCPCS: Performed by: NURSE PRACTITIONER

## 2022-06-27 PROCEDURE — 2022F DILAT RTA XM EVC RTNOPTHY: CPT | Performed by: FAMILY MEDICINE

## 2022-06-27 PROCEDURE — 87086 URINE CULTURE/COLONY COUNT: CPT

## 2022-06-27 PROCEDURE — 85652 RBC SED RATE AUTOMATED: CPT

## 2022-06-27 PROCEDURE — 99214 OFFICE O/P EST MOD 30 MIN: CPT | Performed by: FAMILY MEDICINE

## 2022-06-27 PROCEDURE — 73630 X-RAY EXAM OF FOOT: CPT

## 2022-06-27 PROCEDURE — 94761 N-INVAS EAR/PLS OXIMETRY MLT: CPT

## 2022-06-27 PROCEDURE — 1200000000 HC SEMI PRIVATE

## 2022-06-27 PROCEDURE — 83036 HEMOGLOBIN GLYCOSYLATED A1C: CPT | Performed by: FAMILY MEDICINE

## 2022-06-27 PROCEDURE — G8417 CALC BMI ABV UP PARAM F/U: HCPCS | Performed by: FAMILY MEDICINE

## 2022-06-27 PROCEDURE — 6370000000 HC RX 637 (ALT 250 FOR IP): Performed by: INTERNAL MEDICINE

## 2022-06-27 PROCEDURE — 82962 GLUCOSE BLOOD TEST: CPT

## 2022-06-27 PROCEDURE — 84145 PROCALCITONIN (PCT): CPT

## 2022-06-27 PROCEDURE — 87040 BLOOD CULTURE FOR BACTERIA: CPT

## 2022-06-27 PROCEDURE — 83605 ASSAY OF LACTIC ACID: CPT

## 2022-06-27 PROCEDURE — 82044 UR ALBUMIN SEMIQUANTITATIVE: CPT | Performed by: FAMILY MEDICINE

## 2022-06-27 PROCEDURE — 86141 C-REACTIVE PROTEIN HS: CPT

## 2022-06-27 RX ORDER — DEXTROSE MONOHYDRATE 50 MG/ML
100 INJECTION, SOLUTION INTRAVENOUS PRN
Status: DISCONTINUED | OUTPATIENT
Start: 2022-06-27 | End: 2022-07-01 | Stop reason: HOSPADM

## 2022-06-27 RX ORDER — ONDANSETRON 2 MG/ML
4 INJECTION INTRAMUSCULAR; INTRAVENOUS EVERY 6 HOURS PRN
Status: DISCONTINUED | OUTPATIENT
Start: 2022-06-27 | End: 2022-07-01 | Stop reason: HOSPADM

## 2022-06-27 RX ORDER — INSULIN LISPRO 100 [IU]/ML
0-6 INJECTION, SOLUTION INTRAVENOUS; SUBCUTANEOUS
Status: DISCONTINUED | OUTPATIENT
Start: 2022-06-27 | End: 2022-07-01 | Stop reason: HOSPADM

## 2022-06-27 RX ORDER — INSULIN GLARGINE 100 [IU]/ML
0.15 INJECTION, SOLUTION SUBCUTANEOUS NIGHTLY
Status: DISCONTINUED | OUTPATIENT
Start: 2022-06-27 | End: 2022-06-27

## 2022-06-27 RX ORDER — INSULIN GLARGINE 100 [IU]/ML
30 INJECTION, SOLUTION SUBCUTANEOUS NIGHTLY
Status: DISCONTINUED | OUTPATIENT
Start: 2022-06-27 | End: 2022-06-30

## 2022-06-27 RX ORDER — INSULIN LISPRO 100 [IU]/ML
0.08 INJECTION, SOLUTION INTRAVENOUS; SUBCUTANEOUS
Status: DISCONTINUED | OUTPATIENT
Start: 2022-06-27 | End: 2022-06-27

## 2022-06-27 RX ORDER — SODIUM CHLORIDE 0.9 % (FLUSH) 0.9 %
5-40 SYRINGE (ML) INJECTION PRN
Status: DISCONTINUED | OUTPATIENT
Start: 2022-06-27 | End: 2022-07-01 | Stop reason: HOSPADM

## 2022-06-27 RX ORDER — INSULIN LISPRO 100 [IU]/ML
0-3 INJECTION, SOLUTION INTRAVENOUS; SUBCUTANEOUS NIGHTLY
Status: DISCONTINUED | OUTPATIENT
Start: 2022-06-27 | End: 2022-06-27

## 2022-06-27 RX ORDER — ACETAMINOPHEN 325 MG/1
650 TABLET ORAL EVERY 6 HOURS PRN
Status: DISCONTINUED | OUTPATIENT
Start: 2022-06-27 | End: 2022-07-01 | Stop reason: HOSPADM

## 2022-06-27 RX ORDER — POLYETHYLENE GLYCOL 3350 17 G/17G
17 POWDER, FOR SOLUTION ORAL DAILY PRN
Status: DISCONTINUED | OUTPATIENT
Start: 2022-06-27 | End: 2022-07-01 | Stop reason: HOSPADM

## 2022-06-27 RX ORDER — ENOXAPARIN SODIUM 100 MG/ML
40 INJECTION SUBCUTANEOUS 2 TIMES DAILY
Status: DISCONTINUED | OUTPATIENT
Start: 2022-06-27 | End: 2022-06-28

## 2022-06-27 RX ORDER — SODIUM CHLORIDE 9 MG/ML
INJECTION, SOLUTION INTRAVENOUS PRN
Status: DISCONTINUED | OUTPATIENT
Start: 2022-06-27 | End: 2022-07-01 | Stop reason: HOSPADM

## 2022-06-27 RX ORDER — SODIUM CHLORIDE 0.9 % (FLUSH) 0.9 %
5-40 SYRINGE (ML) INJECTION EVERY 12 HOURS SCHEDULED
Status: DISCONTINUED | OUTPATIENT
Start: 2022-06-27 | End: 2022-07-01 | Stop reason: HOSPADM

## 2022-06-27 RX ORDER — INSULIN LISPRO 100 [IU]/ML
0-3 INJECTION, SOLUTION INTRAVENOUS; SUBCUTANEOUS
Status: DISCONTINUED | OUTPATIENT
Start: 2022-06-28 | End: 2022-07-01

## 2022-06-27 RX ORDER — ONDANSETRON 4 MG/1
4 TABLET, ORALLY DISINTEGRATING ORAL EVERY 8 HOURS PRN
Status: DISCONTINUED | OUTPATIENT
Start: 2022-06-27 | End: 2022-07-01 | Stop reason: HOSPADM

## 2022-06-27 RX ORDER — INSULIN LISPRO 100 [IU]/ML
0-6 INJECTION, SOLUTION INTRAVENOUS; SUBCUTANEOUS
Status: DISCONTINUED | OUTPATIENT
Start: 2022-06-28 | End: 2022-06-27

## 2022-06-27 RX ADMIN — VANCOMYCIN HYDROCHLORIDE 1750 MG: 1 INJECTION, POWDER, LYOPHILIZED, FOR SOLUTION INTRAVENOUS at 21:22

## 2022-06-27 RX ADMIN — INSULIN LISPRO 1 UNITS: 100 INJECTION, SOLUTION INTRAVENOUS; SUBCUTANEOUS at 18:35

## 2022-06-27 RX ADMIN — ENOXAPARIN SODIUM 40 MG: 100 INJECTION SUBCUTANEOUS at 21:23

## 2022-06-27 RX ADMIN — CEFEPIME 2000 MG: 2 INJECTION, POWDER, FOR SOLUTION INTRAVENOUS at 15:05

## 2022-06-27 RX ADMIN — INSULIN GLARGINE 30 UNITS: 100 INJECTION, SOLUTION SUBCUTANEOUS at 22:00

## 2022-06-27 ASSESSMENT — ENCOUNTER SYMPTOMS
SORE THROAT: 0
CONSTIPATION: 0
COUGH: 0
VOMITING: 0
ABDOMINAL PAIN: 0
NAUSEA: 0
EYES NEGATIVE: 1
SHORTNESS OF BREATH: 0

## 2022-06-27 NOTE — PROGRESS NOTES
Pharmacist Review and Automatic Dose Adjustment of Prophylactic Enoxaparin    *Review reason for admission/hospital problem list*    The reviewing pharmacist has made an adjustment to the ordered enoxaparin dose or converted to UFH per the approved [facility] protocol and table as identified below. Dallas Garcia is a 42 y.o. male. Recent Labs     06/27/22  1236   CREATININE 0.7*       Estimated Creatinine Clearance: 261 mL/min (A) (based on SCr of 0.7 mg/dL (L)). Recent Labs     06/27/22  1236   HGB 13.9   HCT 43.4        No results for input(s): INR in the last 72 hours. Height:   Ht Readings from Last 1 Encounters:   06/27/22 6' 8\" (2.032 m)     Weight:  Wt Readings from Last 1 Encounters:   06/27/22 (!) 401 lb (181.9 kg)               Plan: Based upon the patient's weight and renal function, the ordered enoxaparin dose of 40 mg daily has been changed/converted to 40 mg twice daily.       Thank you,  Cherise Peterson, [id number] [facility]  6/27/2022, 5:25 PM

## 2022-06-27 NOTE — H&P
V2.0  History and Physical      Name:  Pepe Hagan /Age/Sex: 1985  (42 y.o. male)   MRN & CSN:  [id number] & [id number] Encounter Date/Time: 2022 2:36 PM EDT   Location:  ED[facility]/ED-19 PCP: Esmeralda Epley, MD       Hospital Day: 1    Assessment and Plan:   Pepe Hagan is a 42 y.o. male with a pmh as noted below presents with cellulitis right great toe with concern for osteomyelitis    Cellulitis  Osteomylitis of Right 2nd Toe                            Admit to inpatient             X-ray right foot with suspected arthritis/osteomyelitis of the proximal joint of the second toe with lateral  dislocation. Blood cultures x2, UA, urine culture, check MRSA swab nares, add on ESR and CRP  Started on vancomycin in ED, add on cefepime  Consult general surgery for evaluation of cellulitis osteomyelitis right great toe   MRI right foot ordered   Consult wound care for chronic right lower extremity wound infection              MRSA swab nares, add on procalcitonin and lactate             Patient afebrile, no evidence of leukocytosis. Trend labs     Insulin dependent type 2 diabetes   -, convert to weight-based long-acting glargine plus sliding scale with hypoglycemia  protocol   -Hold home oral antihyperglycemics, check hemoglobin A1c    Chronic Conditions: continue home medication as ordered       All testing  and results reviewed with patient . All questions answered. Patient and family voiced understanding    This patient was seen and examined in conjunction with Dr. Iveth Goldsmith. He/She was agreeable with the plan and management as dictated above.     Disposition:   Expected Disposition: Home  Estimated D/C: 3 days     Diet No diet orders on file   GI Prophylaxis  [x] PPI,  [] H2 Blocker,  [] Carafate,  [] Diet/Tube Feeds   DVT Prophylaxis [x] Lovenox, []  Heparin, [] SCDs, [] Ambulation,  [] NOAC   Code Status No Order   Surrogate Decision Maker/ POA          History from:     patient, electronic medical (GLUCOPHAGE-XR) 500 MG extended release tablet Take 2 tablets by mouth 2 times daily 4/27/22   Navin Queen MD   blood glucose monitor kit and supplies Use 3-4 times daily 2/23/22   Navin Queen MD   blood glucose monitor strips Test 2-3 times a day & as needed for symptoms of irregular blood glucose. 2/23/22   Navin Queen MD   Lancets MISC Use one lancet 2  times daily 2/23/22   Navin Queen MD       Physical Exam: Need 8 Elements   Physical Exam  Vitals and nursing note reviewed. Constitutional:       General: He is not in acute distress. Appearance: Normal appearance. HENT:      Head: Normocephalic. Nose: Nose normal.      Mouth/Throat:      Pharynx: Oropharynx is clear. Eyes:      Pupils: Pupils are equal, round, and reactive to light. Cardiovascular:      Rate and Rhythm: Normal rate and regular rhythm. Pulses:           Dorsalis pedis pulses are 2+ on the right side. Posterior tibial pulses are 2+ on the right side. Pulmonary:      Effort: Pulmonary effort is normal. No respiratory distress. Breath sounds: No wheezing or rhonchi. Abdominal:      General: Abdomen is flat. Bowel sounds are normal. There is no distension. Musculoskeletal:         General: Normal range of motion. Cervical back: Normal range of motion. Feet:    Feet:      Right foot:      Skin integrity: Ulcer, erythema and warmth present. Comments: 3cm ulceration 2nd   Skin:     General: Skin is warm and dry. Capillary Refill: Capillary refill takes less than 2 seconds. Neurological:      General: No focal deficit present. Mental Status: He is alert and oriented to person, place, and time. Psychiatric:         Mood and Affect: Mood normal.            Past Medical History:   PMHx NODDM  PSHX:  has no past surgical history  Allergies:    Allergies   Allergen Reactions    Bactrim [Sulfamethoxazole-Trimethoprim] Rash     Fam HX:  family history includes Diabetes in his maternal grandmother. Soc HX:   Social History     Socioeconomic History    Marital status: Single     Spouse name: None    Number of children: None    Years of education: None    Highest education level: None   Occupational History    None   Tobacco Use    Smoking status: Never Smoker    Smokeless tobacco: Never Used   Substance and Sexual Activity    Alcohol use: Never    Drug use: Never    Sexual activity: None   Other Topics Concern    None   Social History Narrative    None     Social Determinants of Health     Financial Resource Strain:     Difficulty of Paying Living Expenses: Not on file   Food Insecurity:     Worried About Running Out of Food in the Last Year: Not on file    Sonu of Food in the Last Year: Not on file   Transportation Needs:     Lack of Transportation (Medical): Not on file    Lack of Transportation (Non-Medical):  Not on file   Physical Activity:     Days of Exercise per Week: Not on file    Minutes of Exercise per Session: Not on file   Stress:     Feeling of Stress : Not on file   Social Connections:     Frequency of Communication with Friends and Family: Not on file    Frequency of Social Gatherings with Friends and Family: Not on file    Attends Hinduism Services: Not on file    Active Member of [address] [place] [address] or Organizations: Not on file    Attends Club or Organization Meetings: Not on file    Marital Status: Not on file   Intimate Partner Violence:     Fear of Current or Ex-Partner: Not on file    Emotionally Abused: Not on file    Physically Abused: Not on file    Sexually Abused: Not on file   Housing Stability:     Unable to Pay for Housing in the Last Year: Not on file    Number of [place] in the Last Year: Not on file    Unstable Housing in the Last Year: Not on file       Medications:   Medications:    vancomycin  2,000 mg IntraVENous Once    cefepime  2,000 mg IntraVENous Q8H      Infusions:   PRN Meds:      Labs      CBC:   Recent Labs 06/27/22  1236   WBC 5.2   HGB 13.9        BMP:    Recent Labs     06/27/22  1236      K 4.3      CO2 26   BUN 12   CREATININE 0.7*   GLUCOSE 244*     Hepatic:   Recent Labs     06/27/22  1236   AST 27   ALT 48*   BILITOT 0.6   ALKPHOS 60     Lipids:   Lab Results   Component Value Date    CHOL 169 01/24/2022    HDL 40 01/24/2022    TRIG 191 01/24/2022     Hemoglobin A1C:   Lab Results   Component Value Date    LABA1C 9.9 06/27/2022     TSH:   Lab Results   Component Value Date    TSH 1.73 01/24/2022     Troponin: No results found for: TROPONINT  Lactic Acid: No results for input(s): LACTA in the last 72 hours. BNP: No results for input(s): PROBNP in the last 72 hours. UA:  Lab Results   Component Value Date    NITRU NEGATIVE 06/27/2022    COLORU YELLOW 06/27/2022    WBCUA 1 06/27/2022    RBCUA NONE SEEN 06/27/2022    MUCUS RARE 06/27/2022    TRICHOMONAS NONE SEEN 06/27/2022    BACTERIA NEGATIVE 06/27/2022    CLARITYU CLEAR 06/27/2022    SPECGRAV 1.010 06/27/2022    LEUKOCYTESUR NEGATIVE 06/27/2022    UROBILINOGEN 0.2 06/27/2022    BILIRUBINUR NEGATIVE 06/27/2022    BLOODU NEGATIVE 06/27/2022    KETUA NEGATIVE 06/27/2022     Urine Cultures:   Lab Results   Component Value Date    LABURIN 200mg/dL 06/27/2022     Blood Cultures: No results found for: BC  No results found for: BLOODCULT2  Organism: No results found for: ORG    Imaging/Diagnostics Last 24 Hours   XR FOOT RIGHT (MIN 3 VIEWS)    Result Date: 6/27/2022  EXAMINATION: THREE XRAY VIEWS OF THE RIGHT FOOT 6/27/2022 12:56 pm COMPARISON: None. HISTORY: ORDERING SYSTEM PROVIDED HISTORY: 2nd toe pain. swelling TECHNOLOGIST PROVIDED HISTORY: Reason for exam:->2nd toe pain. swelling Reason for Exam: pain and swelling 2 toe      ` FINDINGS: Focal osteopenia and irregularity of the PIP joint of the 2nd toe with lateral dislocation at that joint. There is associated soft tissue swelling. Moderate hallux valgus and mild great toe MTP osteoarthritis. Bony mineralization is otherwise within normal limits. Suspected septic arthritis/osteomyelitis of the PIP joint of the 2nd toe with lateral dislocation. Consider MRI. Electronically signed by SHAUNNA Arana CNP on 6/27/2022 at 3:11 PM          This dictation was created with voice recognition software. While attempts have been made to review the dictation as it is transcribed, on occasion the spoken word can be misinterpreted by the technology leading to omissions or inappropriate words, phrases or sentences.      Electronically signed by SHAUNNA Arana CNP on 6/27/2022 at 3:11 PM

## 2022-06-27 NOTE — ED NOTES
Medication History  [facility]    Patient Name: Caron Yao 1985     Medication history has been completed by: Harmeet Arec CPhT    Source(s) of information: patient and insurance claims     Primary Care Physician: Med Scott MD     [facility]: [facility]    Allergies as of 06/27/2022 - Fully Reviewed 06/27/2022   Allergen Reaction Noted    Bactrim [sulfamethoxazole-trimethoprim] Rash 01/19/2022        Prior to Admission medications    Medication Sig Start Date End Date Taking? Authorizing Provider   glimepiride (AMARYL) 2 MG tablet Take 1 tablet by mouth every morning (before breakfast) 5/24/22   Miranda Ramirez MD   metFORMIN (GLUCOPHAGE-XR) 500 MG extended release tablet Take 2 tablets by mouth 2 times daily 4/27/22   Med Scott MD   blood glucose monitor kit and supplies Use 3-4 times daily 2/23/22   Med Scott MD   blood glucose monitor strips Test 2-3 times a day & as needed for symptoms of irregular blood glucose. 2/23/22   Med Scott MD   Lancets MISC Use one lancet 2  times daily 2/23/22   Med Scott MD     Medications removed from list (include reason, ex. noncompliance, medication cost, therapy complete etc.):   [place] discontinued by another discipline     Comments:  Medication list reviewed with patient and insurance claims verified.   Patient states he has taken first dose of medications today    To my knowledge the above medication history is accurate as of 6/27/2022 2:33 PM.   Harmeet Arce CPhT   6/27/2022 2:33 PM

## 2022-06-27 NOTE — ED PROVIDER NOTES
Emergency [address] EMERGENCY DEPARTMENT    Patient: Caron Yao  MRN: [id number]  : 1985  Date of Evaluation: 2022  ED Provider: Susy Dunn MD    Chief Complaint       Chief Complaint   Patient presents with    Abscess     right 2nd toe     Poornima Beasley is a 42 y.o. male who presents to the emergency department for evaluation of redness and swelling to his right second toe. Patient reports been usual state of health until approximately 4 to 5 weeks ago when symptoms for first noticed. Denies any pain or antecedent trauma. Does report having recent diagnosis of diabetes and states that his initial A1c several months ago was 13.9 is now down to 9.6 today. He said he initially just had what he thought was a small abscess on the medial aspect of his right second toe he said it was also associated with some swelling of the foot as well and then also of the second toe. He states that he attempted to get in contact with his primary care physician and had multiple appointments that were made but unfortunately had to be postponed. He was able to see his primary care physician today and was advised to come to the emergency department for evaluation and treatment. Patient denies fevers or trauma. Denies chest pain or shortness of breath no history of DVTs or PEs he does report that his blood glucose has been improving over the last several months. ROS:     At least 10 systems reviewed and otherwise acutely negative except as in the [address] Past History   History reviewed. No pertinent past medical history. History reviewed. No pertinent surgical history.   Social History     Socioeconomic History    Marital status: Single     Spouse name: None    Number of children: None    Years of education: None    Highest education level: None   Occupational History    None   Tobacco Use    Smoking status: Never Smoker    Smokeless tobacco: Never Used   Substance and Sexual Activity    Alcohol use: Never    Drug use: Never    Sexual activity: None   Other Topics Concern    None   Social History Narrative    None     Social Determinants of Health     Financial Resource Strain:     Difficulty of Paying Living Expenses: Not on file   Food Insecurity:     Worried About Running Out of Food in the Last Year: Not on file    Sonu of Food in the Last Year: Not on file   Transportation Needs:     Lack of Transportation (Medical): Not on file    Lack of Transportation (Non-Medical): Not on file   Physical Activity:     Days of Exercise per Week: Not on file    Minutes of Exercise per Session: Not on file   Stress:     Feeling of Stress : Not on file   Social Connections:     Frequency of Communication with Friends and Family: Not on file    Frequency of Social Gatherings with Friends and Family: Not on file    Attends Denominational Services: Not on file    Active Member of [address] [place] [address] or Organizations: Not on file    Attends Club or Organization Meetings: Not on file    Marital Status: Not on file   Intimate Partner Violence:     Fear of Current or Ex-Partner: Not on file    Emotionally Abused: Not on file    Physically Abused: Not on file    Sexually Abused: Not on file   Housing Stability:     Unable to Pay for Housing in the Last Year: Not on file    Number of [place] in the Last Year: Not on file    Unstable Housing in the Last Year: Not on file       Medications/Allergies     Previous Medications    BLOOD GLUCOSE MONITOR KIT AND SUPPLIES    Use 3-4 times daily    BLOOD GLUCOSE MONITOR STRIPS    Test 2-3 times a day & as needed for symptoms of irregular blood glucose.     GLIMEPIRIDE (AMARYL) 2 MG TABLET    Take 1 tablet by mouth every morning (before breakfast)    LANCETS MISC    Use one lancet 2  times daily    METFORMIN (GLUCOPHAGE-XR) 500 MG EXTENDED RELEASE TABLET    Take 2 tablets by mouth 2 times daily     Allergies Allergen Reactions    Bactrim [Sulfamethoxazole-Trimethoprim] Rash        Physical Exam       ED Triage Vitals [06/27/22 1135]   BP Temp Temp Source Heart Rate Resp SpO2 Height Weight   123/80 98.2 °F (36.8 °C) Oral 86 17 98 % 6' 8\" (2.032 m) (!) 401 lb (181.9 kg)     GENERAL APPEARANCE: Awake and alert. Cooperative. No acute distress. HEAD: Normocephalic. Atraumatic. EYES: Sclera anicteric. Pupils equal round reactive to light extraocular movements are intact  ENT: Tolerates saliva. No trismus. Moist mucous membranes  NECK: Supple. Trachea midline. No meningismus  CARDIO: RRR. Radial pulse 2+. No murmurs rubs or gallops appreciated  LUNGS: Respirations unlabored. CTAB. No accessory muscle usage noted. No wheezes rales rhonchi or stridor. ABDOMEN: Soft. Non-distended. Non-tender. No tenderness in right upper quadrant or right lower quadrant to deep palpation  EXTREMITIES: No acute deformities. No unilateral leg swelling or tenderness behind either one of calves  SKIN: Warm and dry. No erythema edema or rashes appreciated  NEUROLOGICAL:  Cranial nerves II through XII grossly intact. No gross facial drooping. Moves all 4 extremities spontaneously. PSYCHIATRIC: Normal mood. Alert and oriented x3. No reported active suicidality or homicidality.     Diagnostics   Labs:  Results for orders placed or performed during the hospital encounter of 06/27/22   CBC with Auto Differential   Result Value Ref Range    WBC 5.2 4.0 - 10.5 K/CU MM    RBC 4.83 4.6 - 6.2 M/CU MM    Hemoglobin 13.9 13.5 - 18.0 GM/DL    Hematocrit 43.4 42 - 52 %    MCV 89.9 78 - 100 FL    MCH 28.8 27 - 31 PG    MCHC 32.0 32.0 - 36.0 %    RDW 11.9 11.7 - 14.9 %    Platelets 489 046 - 547 K/CU MM    MPV 12.4 (H) 7.5 - 11.1 FL    Differential Type AUTOMATED DIFFERENTIAL     Segs Relative 50.9 36 - 66 %    Lymphocytes % 40.4 24 - 44 %    Monocytes % 7.5 (H) 0 - 4 %    Eosinophils % 0.6 0 - 3 %    Basophils % 0.4 0 - 1 %    Segs Absolute 2.7 K/CU MM    Lymphocytes Absolute 2.1 K/CU MM    Monocytes Absolute 0.4 K/CU MM    Eosinophils Absolute 0.0 K/CU MM    Basophils Absolute 0.0 K/CU MM    Nucleated RBC % 0.0 %    Total Nucleated RBC 0.0 K/CU MM    TRC 0.1236 K/CU MM    Total Immature Neutrophil 0.01 K/CU MM    Immature Neutrophil % 0.2 0 - 0.43 %   Comprehensive Metabolic Panel w/ Reflex to MG   Result Value Ref Range    Sodium 135 135 - 145 MMOL/L    Potassium 4.3 3.5 - 5.1 MMOL/L    Chloride 100 99 - 110 mMol/L    CO2 26 21 - 32 MMOL/L    BUN 12 6 - 23 MG/DL    CREATININE 0.7 (L) 0.9 - 1.3 MG/DL    Glucose 244 (H) 70 - 99 MG/DL    Calcium 9.2 8.3 - 10.6 MG/DL    Albumin 4.1 3.4 - 5.0 GM/DL    Total Protein 7.3 6.4 - 8.2 GM/DL    Total Bilirubin 0.6 0.0 - 1.0 MG/DL    ALT 48 (H) 10 - 40 U/L    AST 27 15 - 37 IU/L    Alkaline Phosphatase 60 40 - 129 IU/L    GFR Non-African American >60 >60 mL/min/1.73m2    GFR African American >60 >60 mL/min/1.73m2    Anion Gap 9 4 - 16   Urinalysis with Microscopic   Result Value Ref Range    Color, UA YELLOW YELLOW    Clarity, UA CLEAR CLEAR    Glucose, Urine 250 (A) NEGATIVE MG/DL    Bilirubin Urine NEGATIVE NEGATIVE MG/DL    Ketones, Urine NEGATIVE NEGATIVE MG/DL    Specific Gravity, UA 1.010 1.001 - 1.035    Blood, Urine NEGATIVE NEGATIVE    pH, Urine 5.5 5.0 - 8.0    Protein, UA NEGATIVE NEGATIVE MG/DL    Urobilinogen, Urine 0.2 0.2 - 1.0 MG/DL    Nitrite Urine, Quantitative NEGATIVE NEGATIVE    Leukocyte Esterase, Urine NEGATIVE NEGATIVE    RBC, UA NONE SEEN 0 - 3 /HPF    WBC, UA 1 0 - 2 /HPF    Bacteria, UA NEGATIVE NEGATIVE /HPF    Squam Epithel, UA <1 /HPF    Mucus, UA RARE (A) NEGATIVE HPF    Trichomonas, UA NONE SEEN NONE SEEN /HPF   Lactate, Sepsis   Result Value Ref Range    Lactic Acid, Sepsis 1.3 0.5 - 1.9 mMOL/L   Sedimentation Rate   Result Value Ref Range    Sed Rate 41 (H) 0 - 15 MM/HR   POCT Glucose   Result Value Ref Range    POC Glucose 237 (H) 70 - 99 MG/DL     Radiographs:  XR FOOT RIGHT (MIN 3 VIEWS)    Result Date: 6/27/2022  EXAMINATION: THREE XRAY VIEWS OF THE RIGHT FOOT 6/27/2022 12:56 pm COMPARISON: None. HISTORY: ORDERING SYSTEM PROVIDED HISTORY: 2nd toe pain. swelling TECHNOLOGIST PROVIDED HISTORY: Reason for exam:->2nd toe pain. swelling Reason for Exam: pain and swelling 2 toe      ` FINDINGS: Focal osteopenia and irregularity of the PIP joint of the 2nd toe with lateral dislocation at that joint. There is associated soft tissue swelling. Moderate hallux valgus and mild great toe MTP osteoarthritis. Bony mineralization is otherwise within normal limits. Suspected septic arthritis/osteomyelitis of the PIP joint of the 2nd toe with lateral dislocation. Consider MRI. Procedures/EKG:       ED Course and MDM   In brief, David Crane is a 42 y.o. male who presented to the emergency department for evaluation of swelling of his left second toe in the setting of poorly controlled diabetes. Based on patient's history and physical would be concern for possible early osteomyelitis. Other possibilities do include cellulitis. No evidence of trauma noted on examination. Patient does have palpable pulses intact sensation to light touch throughout. Right foot is mildly edematous but there are no red streaks running from the affected area. He is not tachycardic or febrile. Low clinical suspicion for acute systemic illness at this time no evidence of new trauma. I did review patient's imaging studies and laboratory work as noted above. Patient does have evidence of osteomyelitis the PIP joint of the second toe. Lateral dislocation of the toe was noted as well. Although  The patient denies any antecedent injury. Unknown when the dislocation occurred so reduction will not be performed in the emergency department at this time. Patient was started on vancomycin.   Given the patient's poorly controlled diabetes and the time course of patient's symptoms patient was referred to hospitalist for continuation of care and treatment. ED Medication Orders (From admission, onward)    Start Ordered     Status Ordering Provider    06/27/22 1600 06/27/22 1424  vancomycin (VANCOCIN) 2,000 mg in dextrose 5 % 500 mL IVPB  ONCE        Question:  Antimicrobial Indications  Answer:  Bone and Joint Infection    Acknowledged KATHERINE EDWARDLAS    06/27/22 1500 06/27/22 1433  cefepime (MAXIPIME) 2000 mg IVPB minibag  EVERY 8 HOURS        Question Answer Comment   Antimicrobial Indications Skin and Soft Tissue Infection    Skin duration of therapy 7 days        Acknowledged Marcheta Sang I          Final Impression      1. Osteomyelitis of toe of right foot ([address]    2.  Diabetic infection of right foot ([address]      DISPOSITION           (Please note that portions of this note may have been completed with a voice recognition program. Efforts were made to edit the dictations but occasionally words are mis-transcribed.)    Kade Gerber MD  [address] [place] [address] MD Alessandro  06/27/22 4818

## 2022-06-27 NOTE — PROGRESS NOTES
Gala Walls  1985  06/28/22    Chief Complaint   Patient presents with    Diabetes     Patient here for diabetic F/U    Leg Pain     Patient states having right leg pain. Patient here for a 1 month follow-up regarding his diabetes, we did start him on a glimepiride, we did add Rybelsus but was not covered by his insurance. Tolerate the glimepiride. Patient also complaining of right leg pain going on for a month, also patient noticed opening on the second toe of the right foot, with some drainage, and swelling denies fever denies trauma. History reviewed. No pertinent past medical history. History reviewed. No pertinent surgical history. Family History   Problem Relation Age of Onset    Diabetes Maternal Grandmother      Social History     Socioeconomic History    Marital status: Single     Spouse name: Not on file    Number of children: Not on file    Years of education: Not on file    Highest education level: Not on file   Occupational History    Not on file   Tobacco Use    Smoking status: Never Smoker    Smokeless tobacco: Never Used   Substance and Sexual Activity    Alcohol use: Never    Drug use: Never    Sexual activity: Not on file   Other Topics Concern    Not on file   Social History Narrative    Not on file     Social Determinants of Health     Financial Resource Strain:     Difficulty of Paying Living Expenses: Not on file   Food Insecurity:     Worried About Running Out of Food in the Last Year: Not on file    Sonu of Food in the Last Year: Not on file   Transportation Needs:     Lack of Transportation (Medical): Not on file    Lack of Transportation (Non-Medical):  Not on file   Physical Activity:     Days of Exercise per Week: Not on file    Minutes of Exercise per Session: Not on file   Stress:     Feeling of Stress : Not on file   Social Connections:     Frequency of Communication with Friends and Family: Not on file    Frequency of Social Gatherings with Friends and Family: Not on file    Attends Restoration Services: Not on file    Active Member of Clubs or Organizations: Not on file    Attends Club or Organization Meetings: Not on file    Marital Status: Not on file   Intimate Partner Violence:     Fear of Current or Ex-Partner: Not on file    Emotionally Abused: Not on file    Physically Abused: Not on file    Sexually Abused: Not on file   Housing Stability:     Unable to Pay for Housing in the Last Year: Not on file    Number of [place] in the Last Year: Not on file    Unstable Housing in the Last Year: Not on file       Allergies   Allergen Reactions    Bactrim [Sulfamethoxazole-Trimethoprim] Rash     No current facility-administered medications for this visit. No current outpatient medications on file.      Facility-Administered Medications Ordered in Other Visits   Medication Dose Route Frequency Provider Last Rate Last Admin    cefepime (MAXIPIME) 2000 mg IVPB minibag  2,000 mg IntraVENous Q8H SHAUNNA Cárdenas CNP   Stopped at 06/28/22 0032    sodium chloride flush 0.9 % injection 5-40 mL  5-40 mL IntraVENous 2 times per day SHAUNNA Suero CNP        sodium chloride flush 0.9 % injection 5-40 mL  5-40 mL IntraVENous PRN SHAUNNA Suero CNP        0.9 % sodium chloride infusion   IntraVENous PRN SHAUNNA Suero CNP        enoxaparin (LOVENOX) injection 40 mg  40 mg SubCUTAneous BID SHAUNNA Suero - CNP   40 mg at 06/27/22 2123    ondansetron (ZOFRAN-ODT) disintegrating tablet 4 mg  4 mg Oral Q8H PRN SHAUNNA Suero CNP        Or    ondansetron (ZOFRAN) injection 4 mg  4 mg IntraVENous Q6H PRN SHAUNNA Suero CNP        polyethylene glycol (GLYCOLAX) packet 17 g  17 g Oral Daily PRN SHAUNNA Suero CNP        acetaminophen (TYLENOL) tablet 650 mg  650 mg Oral Q6H PRN SHAUNNA Tracey CNP        Or   [address] [place] [address] acetaminophen (TYLENOL) suppository 650 mg  650 mg Rectal Q6H PRN Ainsley Everett APRN - CNP        glucose chewable tablet 16 g  4 tablet Oral PRN Ainsley Everett, APRN - CNP        dextrose bolus 10% 125 mL  125 mL IntraVENous PRN Ainsley I Karlos, SHAUNNA - CNP        Or    dextrose bolus 10% 250 mL  250 mL IntraVENous PRN Ainsley I Karlos, APRN - CNP        glucagon (rDNA) injection 1 mg  1 mg IntraMUSCular PRN Ainsley I Karlos, APRN - CNP        dextrose 5 % solution  100 mL/hr IntraVENous PRN Ainsley I Karlos, APRN - CNP        insulin lispro (HUMALOG) injection vial 0-6 Units  0-6 Units SubCUTAneous TID WC SHAUNNA Suero - CNP   1 Units at 06/27/22 1835    vancomycin (VANCOCIN) 1,750 mg in dextrose 5 % 500 mL IVPB  1,750 mg IntraVENous Q8H SHAUNNA Suero -  mL/hr at 06/28/22 0453 1,750 mg at 06/28/22 0453    insulin glargine (LANTUS) injection vial 30 Units  30 Units SubCUTAneous Nightly Ramon Troy MD   30 Units at 06/27/22 2200    insulin lispro (HUMALOG) injection vial 0-3 Units  0-3 Units SubCUTAneous 2 times per day Ramon Troy MD   1 Units at 06/28/22 0244       Review of Systems   Constitutional: Negative for activity change, chills, fatigue and fever. Respiratory: Negative for cough and shortness of breath. Cardiovascular: Negative for chest pain and leg swelling. Skin: Positive for wound (On the second toe of the right foot). Psychiatric/Behavioral: Negative for agitation. The patient is not nervous/anxious.         Lab Results   Component Value Date    WBC 5.1 06/28/2022    HGB 12.7 (L) 06/28/2022    HCT 40.5 (L) 06/28/2022    MCV 90.2 06/28/2022     06/28/2022     Lab Results   Component Value Date     06/28/2022    K 4.3 06/28/2022     06/28/2022    CO2 29 06/28/2022    BUN 8 06/28/2022    CREATININE 0.8 (L) 06/28/2022    GLUCOSE 207 (H) 06/28/2022    CALCIUM 9.0 06/28/2022    PROT 6.5 06/28/2022 LABALBU 4.1 06/28/2022    BILITOT 0.9 06/28/2022    ALKPHOS 55 06/28/2022    AST 37 06/28/2022    ALT 55 (H) 06/28/2022    LABGLOM >60 06/28/2022    GFRAA >60 06/28/2022    AGRATIO 1.7 01/24/2022     Lab Results   Component Value Date    CHOL 169 01/24/2022     Lab Results   Component Value Date    TRIG 191 (H) 01/24/2022     Lab Results   Component Value Date    HDL 40 01/24/2022     Lab Results   Component Value Date    LDLCALC 91 01/24/2022     Lab Results   Component Value Date    LABA1C 9.9 06/27/2022     Lab Results   Component Value Date    TSH 1.73 01/24/2022         /86 (Site: Left Upper Arm, Position: Sitting, Cuff Size: Large Adult)   Pulse 94   Wt (!) 401 lb 6.4 oz (182.1 kg)   SpO2 97%   BMI 44.10 kg/m²     BP Readings from Last 3 Encounters:   06/28/22 133/86   06/27/22 138/86   04/27/22 124/88       Wt Readings from Last 3 Encounters:   06/28/22 (!) 400 lb (181.4 kg)   06/27/22 (!) 400 lb 1 oz (181.5 kg)   06/27/22 (!) 401 lb 6.4 oz (182.1 kg)         Physical Exam  Constitutional:       General: He is not in acute distress. Appearance: Normal appearance. He is well-developed. He is obese. He is not diaphoretic. HENT:      Head: Normocephalic and atraumatic. Eyes:      General: No scleral icterus. Cardiovascular:      Rate and Rhythm: Normal rate and regular rhythm. Heart sounds: Normal heart sounds. No murmur heard. Pulmonary:      Effort: Pulmonary effort is normal.      Breath sounds: Normal breath sounds. No wheezing. Musculoskeletal:         General: Normal range of motion. Cervical back: Normal range of motion and neck supple. No rigidity. Right lower leg: No edema. Left lower leg: No edema. Skin:     Comments: There is a huge swelling of the second right toe with the black discoloration and also opening on the medial side of the toe with lots of pus and drainage    Neurological:      General: No focal deficit present.       Mental Status: He is alert and oriented to person, place, and time. Cranial Nerves: No cranial nerve deficit. Psychiatric:         Mood and Affect: Mood normal.         Behavior: Behavior normal.         ASSESSMENT/ PLAN:    1. Uncontrolled type 2 diabetes mellitus with hyperglycemia (HCC)  -Will increase his glimepiride, after he found he has a cellulitis and abscess of the right second toe we will send him to the emergency room for admission and further evaluation  - POCT glycosylated hemoglobin (Hb A1C)  - POCT microalbumin    2. Cellulitis of toe of right foot  -We will send patient to the emergency room for further evaluation need IV antibiotic x-ray and further evaluation              - All old blood work reviewed with the patient  - Appropriate prescription are addressed. - After visit carolyney provided. - Questions answered and patient verbalizes understanding.  - Call for any problem, questions, or concerns. No follow-ups on file.   See him after discharge from the hospital

## 2022-06-28 ENCOUNTER — APPOINTMENT (OUTPATIENT)
Dept: MRI IMAGING | Age: 37
DRG: 617 | End: 2022-06-28
Payer: COMMERCIAL

## 2022-06-28 ENCOUNTER — ANESTHESIA EVENT (OUTPATIENT)
Dept: OPERATING ROOM | Age: 37
DRG: 617 | End: 2022-06-28
Payer: COMMERCIAL

## 2022-06-28 PROBLEM — E11.65 UNCONTROLLED TYPE 2 DIABETES MELLITUS WITH HYPERGLYCEMIA (HCC): Status: ACTIVE | Noted: 2022-06-28

## 2022-06-28 LAB
ALBUMIN SERPL-MCNC: 4.1 GM/DL (ref 3.4–5)
ALP BLD-CCNC: 55 IU/L (ref 40–128)
ALT SERPL-CCNC: 55 U/L (ref 10–40)
ANION GAP SERPL CALCULATED.3IONS-SCNC: 10 MMOL/L (ref 4–16)
AST SERPL-CCNC: 37 IU/L (ref 15–37)
BASOPHILS ABSOLUTE: 0 K/CU MM
BASOPHILS RELATIVE PERCENT: 0.4 % (ref 0–1)
BILIRUB SERPL-MCNC: 0.9 MG/DL (ref 0–1)
BUN BLDV-MCNC: 8 MG/DL (ref 6–23)
CALCIUM SERPL-MCNC: 9 MG/DL (ref 8.3–10.6)
CHLORIDE BLD-SCNC: 100 MMOL/L (ref 99–110)
CO2: 29 MMOL/L (ref 21–32)
CREAT SERPL-MCNC: 0.8 MG/DL (ref 0.9–1.3)
CULTURE: NORMAL
DIFFERENTIAL TYPE: ABNORMAL
EOSINOPHILS ABSOLUTE: 0.1 K/CU MM
EOSINOPHILS RELATIVE PERCENT: 1.2 % (ref 0–3)
ESTIMATED AVERAGE GLUCOSE: 220 MG/DL
GFR AFRICAN AMERICAN: >60 ML/MIN/1.73M2
GFR NON-AFRICAN AMERICAN: >60 ML/MIN/1.73M2
GLUCOSE BLD-MCNC: 178 MG/DL (ref 70–99)
GLUCOSE BLD-MCNC: 195 MG/DL (ref 70–99)
GLUCOSE BLD-MCNC: 207 MG/DL (ref 70–99)
GLUCOSE BLD-MCNC: 234 MG/DL (ref 70–99)
GLUCOSE BLD-MCNC: 238 MG/DL (ref 70–99)
GLUCOSE BLD-MCNC: 256 MG/DL (ref 70–99)
HBA1C MFR BLD: 9.3 % (ref 4.2–6.3)
HCT VFR BLD CALC: 40.5 % (ref 42–52)
HEMOGLOBIN: 12.7 GM/DL (ref 13.5–18)
HIGH SENSITIVE C-REACTIVE PROTEIN: 2.1 MG/L
IMMATURE NEUTROPHIL %: 0.2 % (ref 0–0.43)
LYMPHOCYTES ABSOLUTE: 1.9 K/CU MM
LYMPHOCYTES RELATIVE PERCENT: 36.3 % (ref 24–44)
Lab: NORMAL
MCH RBC QN AUTO: 28.3 PG (ref 27–31)
MCHC RBC AUTO-ENTMCNC: 31.4 % (ref 32–36)
MCV RBC AUTO: 90.2 FL (ref 78–100)
MONOCYTES ABSOLUTE: 0.5 K/CU MM
MONOCYTES RELATIVE PERCENT: 9.8 % (ref 0–4)
NUCLEATED RBC %: 0 %
PDW BLD-RTO: 11.9 % (ref 11.7–14.9)
PLATELET # BLD: 150 K/CU MM (ref 140–440)
PMV BLD AUTO: 12.2 FL (ref 7.5–11.1)
POTASSIUM SERPL-SCNC: 4.3 MMOL/L (ref 3.5–5.1)
PROCALCITONIN: 0.05
RBC # BLD: 4.49 M/CU MM (ref 4.6–6.2)
SEGMENTED NEUTROPHILS ABSOLUTE COUNT: 2.7 K/CU MM
SEGMENTED NEUTROPHILS RELATIVE PERCENT: 52.1 % (ref 36–66)
SODIUM BLD-SCNC: 139 MMOL/L (ref 135–145)
SPECIMEN: NORMAL
TOTAL IMMATURE NEUTOROPHIL: 0.01 K/CU MM
TOTAL NUCLEATED RBC: 0 K/CU MM
TOTAL PROTEIN: 6.5 GM/DL (ref 6.4–8.2)
WBC # BLD: 5.1 K/CU MM (ref 4–10.5)

## 2022-06-28 PROCEDURE — 6370000000 HC RX 637 (ALT 250 FOR IP): Performed by: NURSE PRACTITIONER

## 2022-06-28 PROCEDURE — 94761 N-INVAS EAR/PLS OXIMETRY MLT: CPT

## 2022-06-28 PROCEDURE — 85025 COMPLETE CBC W/AUTO DIFF WBC: CPT

## 2022-06-28 PROCEDURE — 83036 HEMOGLOBIN GLYCOSYLATED A1C: CPT

## 2022-06-28 PROCEDURE — 2500000003 HC RX 250 WO HCPCS: Performed by: NURSE PRACTITIONER

## 2022-06-28 PROCEDURE — 80053 COMPREHEN METABOLIC PANEL: CPT

## 2022-06-28 PROCEDURE — 2580000003 HC RX 258: Performed by: PHYSICIAN ASSISTANT

## 2022-06-28 PROCEDURE — 2580000003 HC RX 258: Performed by: NURSE PRACTITIONER

## 2022-06-28 PROCEDURE — 6360000002 HC RX W HCPCS: Performed by: PHYSICIAN ASSISTANT

## 2022-06-28 PROCEDURE — 86141 C-REACTIVE PROTEIN HS: CPT

## 2022-06-28 PROCEDURE — 99223 1ST HOSP IP/OBS HIGH 75: CPT | Performed by: SURGERY

## 2022-06-28 PROCEDURE — 82962 GLUCOSE BLOOD TEST: CPT

## 2022-06-28 PROCEDURE — 6370000000 HC RX 637 (ALT 250 FOR IP): Performed by: INTERNAL MEDICINE

## 2022-06-28 PROCEDURE — 73718 MRI LOWER EXTREMITY W/O DYE: CPT

## 2022-06-28 PROCEDURE — 6360000002 HC RX W HCPCS: Performed by: NURSE PRACTITIONER

## 2022-06-28 PROCEDURE — 1200000000 HC SEMI PRIVATE

## 2022-06-28 PROCEDURE — 84145 PROCALCITONIN (PCT): CPT

## 2022-06-28 PROCEDURE — 36415 COLL VENOUS BLD VENIPUNCTURE: CPT

## 2022-06-28 RX ORDER — METRONIDAZOLE 500 MG/100ML
500 INJECTION, SOLUTION INTRAVENOUS EVERY 8 HOURS
Status: DISCONTINUED | OUTPATIENT
Start: 2022-06-28 | End: 2022-06-30

## 2022-06-28 RX ADMIN — CEFEPIME 2000 MG: 2 INJECTION, POWDER, FOR SOLUTION INTRAVENOUS at 23:32

## 2022-06-28 RX ADMIN — VANCOMYCIN HYDROCHLORIDE 1500 MG: 1 INJECTION, POWDER, LYOPHILIZED, FOR SOLUTION INTRAVENOUS at 21:18

## 2022-06-28 RX ADMIN — INSULIN GLARGINE 30 UNITS: 100 INJECTION, SOLUTION SUBCUTANEOUS at 21:12

## 2022-06-28 RX ADMIN — ENOXAPARIN SODIUM 40 MG: 100 INJECTION SUBCUTANEOUS at 09:51

## 2022-06-28 RX ADMIN — VANCOMYCIN HYDROCHLORIDE 1750 MG: 1 INJECTION, POWDER, LYOPHILIZED, FOR SOLUTION INTRAVENOUS at 14:22

## 2022-06-28 RX ADMIN — CEFEPIME 2000 MG: 2 INJECTION, POWDER, FOR SOLUTION INTRAVENOUS at 10:05

## 2022-06-28 RX ADMIN — INSULIN LISPRO 1 UNITS: 100 INJECTION, SOLUTION INTRAVENOUS; SUBCUTANEOUS at 18:11

## 2022-06-28 RX ADMIN — METRONIDAZOLE 500 MG: 500 INJECTION, SOLUTION INTRAVENOUS at 18:17

## 2022-06-28 RX ADMIN — INSULIN LISPRO 1 UNITS: 100 INJECTION, SOLUTION INTRAVENOUS; SUBCUTANEOUS at 02:44

## 2022-06-28 RX ADMIN — VANCOMYCIN HYDROCHLORIDE 1750 MG: 1 INJECTION, POWDER, LYOPHILIZED, FOR SOLUTION INTRAVENOUS at 04:53

## 2022-06-28 RX ADMIN — INSULIN LISPRO 1 UNITS: 100 INJECTION, SOLUTION INTRAVENOUS; SUBCUTANEOUS at 21:11

## 2022-06-28 RX ADMIN — SODIUM CHLORIDE, PRESERVATIVE FREE 10 ML: 5 INJECTION INTRAVENOUS at 21:15

## 2022-06-28 RX ADMIN — INSULIN LISPRO 3 UNITS: 100 INJECTION, SOLUTION INTRAVENOUS; SUBCUTANEOUS at 12:58

## 2022-06-28 RX ADMIN — INSULIN LISPRO 2 UNITS: 100 INJECTION, SOLUTION INTRAVENOUS; SUBCUTANEOUS at 09:52

## 2022-06-28 RX ADMIN — CEFEPIME 2000 MG: 2 INJECTION, POWDER, FOR SOLUTION INTRAVENOUS at 18:14

## 2022-06-28 RX ADMIN — CEFEPIME 2000 MG: 2 INJECTION, POWDER, FOR SOLUTION INTRAVENOUS at 00:02

## 2022-06-28 ASSESSMENT — ENCOUNTER SYMPTOMS
EYE REDNESS: 0
RECTAL PAIN: 0
COLOR CHANGE: 0
COUGH: 0
BACK PAIN: 0
PHOTOPHOBIA: 0
EYE ITCHING: 0
CONSTIPATION: 0
CHOKING: 0
SHORTNESS OF BREATH: 0
ANAL BLEEDING: 0
SORE THROAT: 0
STRIDOR: 0
APNEA: 0

## 2022-06-28 NOTE — PROGRESS NOTES
Physician Progress Note      PATIENT:               Karie Baer  CSN #:                  [id number]  :                       1985  ADMIT DATE:       2022 12:01 PM  DISCH DATE:  RESPONDING  PROVIDER #:        Devera Phalen          QUERY TEXT:    Pt admitted with right great toe cellulitis. Pt noted to have IDDM. If   possible, please document in progress notes and discharge summary the   relationship, if any, between cellulitis and DM. The medical record reflects the following:  Risk Factors: DM  Clinical Indicators: redness and swelling right great toe, possibly second   toe. Treatment: labs, imaging,  IV Vancomycin, IV Cefepime, surgery consult, wound   care consult, endocrinology consult    CHARLI Knapp, RN, [facility]  Clinical   [phone]  Options provided:  -- Right great toe cellulitis associated with Diabetes  -- Right great toe cellulitis unrelated to Diabetes  -- Other - I will add my own diagnosis  -- Disagree - Not applicable / Not valid  -- Disagree - Clinically unable to determine / Unknown  -- Refer to Clinical Documentation Reviewer    PROVIDER RESPONSE TEXT:    Right great toe cellulitis associated with Diabetes.     Query created by: Georgette Mckenzie on 2022 10:31 AM      Electronically signed by:  Devera Phalen 2022 11:06 AM Repair Performed By Another Provider Text (Leave Blank If You Do Not Want): After obtaining clear surgical margins the defect was repaired by another provider.

## 2022-06-28 NOTE — CONSULTS
[address]  consulted by KELLIE Preston  for monitoring and adjustment. Indication for treatment: Bone and joint infection  Goal trough: [] 10-15 mcg/mL or [x] 15-20 mcg/ml  AUC/LARISA: [] <500 or [] 400-600    Pertinent Laboratory Values:   Temp Readings from Last 3 Encounters:   06/28/22 98.7 °F (37.1 °C) (Oral)     Recent Labs     06/27/22  1236 06/28/22  0646   WBC 5.2 5.1     Recent Labs     06/27/22  1236 06/28/22  0646   BUN 12 8   CREATININE 0.7* 0.8*     Estimated Creatinine Clearance: 228 mL/min (A) (based on SCr of 0.8 mg/dL (L)). No intake or output data in the 24 hours ending 06/28/22 1521    Pertinent Cultures:  Date    Source    Results  6/28               Blood    No growth  6/28               Urine    No growth    Vancomycin level:   TROUGH:  No results for input(s): VANCOTROUGH in the last 72 hours. RANDOM:  No results for input(s): VANCORANDOM in the last 72 hours. Assessment:  SCr, BUN, and urine output: SCR appears  to baseline @0.8, no UOP data  Day(s) of therapy: 1 (duration TBD)  Vancomycin concentration:   6/29 - trough @05:00    Plan:  Pt currently ordered on vancomycin 1750mg ivpb q8h with a predicted AUC over target. Decrease to vancomycin 1500mg ivpb q8h with a predicted AUC of 537 at steady state. Check the vanco level tomorrow am.  Will follow renal trends closely and monitor for accumulation 2/2 BMI. Pharmacy will continue to monitor patient and adjust therapy as indicated    Francisco 3 6/29 @05:00    Thank you for the consult. Cody Krishna, [facility]  6/28/2022 3:21 PM

## 2022-06-28 NOTE — CONSULTS
Department of GeneralSurgery   Surgical Service Dr Jacek Espinosa   Consult Note    Date of Consult: 6/28/22      Reason for Consult: Right second toe osteomyelitis  Requesting Physician: Hospitalist    CHIEF COMPLAINT: Right second toe infection and pain    History Obtained From:  patient    HISTORY OF PRESENT ILLNESS:                The patient is a 42 y.o. male who presents with 1 month history of right second toe swelling and pain. Patient feels like this was as a result of tight fitting shoes. He is diabetic with some neuropathy. Pain is described as dull. Pain level is 4/10. There has been some mild drainage from the medial aspect of the second toe at the MIP. He denies fever or chills. There is some swelling on the foot without cellulitis. He presented to the ER for evaluation and was admitted for IV antibiotics. Initial x-ray was concerning for osteomyelitis. Patient was sent for MRI and this to shows     Impression   Findings consistent with acute septic arthritis and osteomyelitis at the 2nd   PIP joint with osteomyelitis involving nearly the entire middle and proximal   phalanges.  A soft tissue defect communicates with the PIP joint space.             Past Medical History:    History reviewed. No pertinent past medical history. Past Surgical History:    History reviewed. No pertinent surgical history.     Current Medications:   Current Facility-Administered Medications   Medication Dose Route Frequency Provider Last Rate Last Admin    cefepime (MAXIPIME) 2000 mg IVPB minibag  2,000 mg IntraVENous Q8H SHAUNNA Suero CNP   Stopped at 06/28/22 1202    sodium chloride flush 0.9 % injection 5-40 mL  5-40 mL IntraVENous 2 times per day SHAUNNA Suero CNP        sodium chloride flush 0.9 % injection 5-40 mL  5-40 mL IntraVENous PRN SHAUNNA Suero CNP        0.9 % sodium chloride infusion   IntraVENous PRN SHAUNNA Yang CNP        ondansetron (ZOFRAN-ODT) disintegrating tablet 4 mg  4 mg Oral Q8H PRN SHAUNNA Suero CNP        Or    ondansetron (ZOFRAN) injection 4 mg  4 mg IntraVENous Q6H PRN SHAUNNA Suero CNP        polyethylene glycol (GLYCOLAX) packet 17 g  17 g Oral Daily PRN SHAUNNA Suero CNP        acetaminophen (TYLENOL) tablet 650 mg  650 mg Oral Q6H PRN SHAUNNA Suero CNP        Or    acetaminophen (TYLENOL) suppository 650 mg  650 mg Rectal Q6H PRN SHAUNNA Suero CNP        glucose chewable tablet 16 g  4 tablet Oral PRN SHAUNNA Suero CNP        dextrose bolus 10% 125 mL  125 mL IntraVENous PRN SHAUNNA Suero CNP        Or    dextrose bolus 10% 250 mL  250 mL IntraVENous PRN SHAUNNA Suero - CNP        glucagon (rDNA) injection 1 mg  1 mg IntraMUSCular PRN SHAUNNA Suero - CNP        dextrose 5 % solution  100 mL/hr IntraVENous PRN SHAUNNA Suero - CNP        insulin lispro (HUMALOG) injection vial 0-6 Units  0-6 Units SubCUTAneous TID WC SHAUNNA Suero - CNP   3 Units at 06/28/22 1258    vancomycin (VANCOCIN) 1,750 mg in dextrose 5 % 500 mL IVPB  1,750 mg IntraVENous Q8H SHAUNNA Suero  mL/hr at 06/28/22 1422 1,750 mg at 06/28/22 1422    insulin glargine (LANTUS) injection vial 30 Units  30 Units SubCUTAneous Nightly Juan Ramon Reyna MD   30 Units at 06/27/22 2200    insulin lispro (HUMALOG) injection vial 0-3 Units  0-3 Units SubCUTAneous 2 times per day Juan Ramon Reyna MD   1 Units at 06/28/22 0244       Allergies:  Bactrim [sulfamethoxazole-trimethoprim]    Social History:   Social History     Socioeconomic History    Marital status: Single     Spouse name: None    Number of children: None    Years of education: None    Highest education level: None   Occupational History    None   Tobacco Use    Smoking status: Never Smoker    Smokeless tobacco: Never Used   Substance and Sexual Activity    Alcohol use: Never    Drug use: Never    Sexual activity: None   Other Topics Concern    None   Social History Narrative    None     Social Determinants of Health     Financial Resource Strain:     Difficulty of Paying Living Expenses: Not on file   Food Insecurity:     Worried About Running Out of Food in the Last Year: Not on file    Sonu of Food in the Last Year: Not on file   Transportation Needs:     Lack of Transportation (Medical): Not on file    Lack of Transportation (Non-Medical): Not on file   Physical Activity:     Days of Exercise per Week: Not on file    Minutes of Exercise per Session: Not on file   Stress:     Feeling of Stress : Not on file   Social Connections:     Frequency of Communication with Friends and Family: Not on file    Frequency of Social Gatherings with Friends and Family: Not on file    Attends Catholic Services: Not on file    Active Member of [address] [place] [address] [employer] or Organizations: Not on file    Attends Club or Organization Meetings: Not on file    Marital Status: Not on file   Intimate Partner Violence:     Fear of Current or Ex-Partner: Not on file    Emotionally Abused: Not on file    Physically Abused: Not on file    Sexually Abused: Not on file   Housing Stability:     Unable to Pay for Housing in the Last Year: Not on file    Number of [place] in the Last Year: Not on file    Unstable Housing in the Last Year: Not on file       Family History:   Family History   Problem Relation Age of Onset    Diabetes Maternal Grandmother        REVIEW OFSYSTEMS:    Review of Systems   Constitutional: Negative for chills and fever. HENT: Negative for ear pain, mouth sores, sore throat and tinnitus. Eyes: Negative for photophobia, redness and itching. Respiratory: Negative for apnea, choking and stridor. Cardiovascular: Negative for chest pain and palpitations.    Gastrointestinal: Negative for anal bleeding, constipation and rectal pain. Endocrine: Negative for polydipsia. Genitourinary: Negative for enuresis, flank pain and hematuria. Musculoskeletal: Negative for back pain, joint swelling and myalgias. Skin: Positive for wound. Negative for color change and pallor. Allergic/Immunologic: Negative for environmental allergies. Neurological: Negative for syncope and speech difficulty. Psychiatric/Behavioral: Negative for confusion and hallucinations.        PHYSICAL EXAM:  Vitals:    06/27/22 2112 06/28/22 0225 06/28/22 0815 06/28/22 1341   BP: (!) 145/81 133/86 124/70 130/73   Pulse: 84 78 70 83   Resp: 16 16 16 20   Temp: (!) 96.5 °F (35.8 °C) 98.5 °F (36.9 °C) 97.8 °F (36.6 °C) 98.7 °F (37.1 °C)   TempSrc: Oral Oral Oral Oral   SpO2: 96% 98% 98% 96%   Weight:  (!) 400 lb (181.4 kg)     Height:           Physical Exam  Musculoskeletal:        Feet:            DATA:    CBC with Differential:    Lab Results   Component Value Date    WBC 5.1 06/28/2022    RBC 4.49 06/28/2022    HGB 12.7 06/28/2022    HCT 40.5 06/28/2022     06/28/2022    MCV 90.2 06/28/2022    MCH 28.3 06/28/2022    MCHC 31.4 06/28/2022    RDW 11.9 06/28/2022    SEGSPCT 52.1 06/28/2022    LYMPHOPCT 36.3 06/28/2022    MONOPCT 9.8 06/28/2022    BASOPCT 0.4 06/28/2022    MONOSABS 0.5 06/28/2022    LYMPHSABS 1.9 06/28/2022    EOSABS 0.1 06/28/2022    BASOSABS 0.0 06/28/2022    DIFFTYPE AUTOMATED DIFFERENTIAL 06/28/2022     CMP:    Lab Results   Component Value Date     06/28/2022    K 4.3 06/28/2022     06/28/2022    CO2 29 06/28/2022    BUN 8 06/28/2022    CREATININE 0.8 06/28/2022    GFRAA >60 06/28/2022    AGRATIO 1.7 01/24/2022    LABGLOM >60 06/28/2022    GLUCOSE 207 06/28/2022    PROT 6.5 06/28/2022    LABALBU 4.1 06/28/2022    CALCIUM 9.0 06/28/2022    BILITOT 0.9 06/28/2022    ALKPHOS 55 06/28/2022    AST 37 06/28/2022    ALT 55 06/28/2022       IMPRESSION:        Patient Active Problem List:     Type 2 diabetes mellitus without complication, without long-term current use of insulin (HCC)     Class 3 severe obesity without serious comorbidity with body mass index (BMI) of 40.0 to 44.9 in [facility])     Cellulitis of toe of right foot     Uncontrolled type 2 diabetes mellitus with hyperglycemia (HCC)    Osteomyelitis of the right second toe. PLAN:    We will proceed with right second toe imitation in a.m. since patient had something to eat this morning. Care and surgical procedure discussed with patient.   N.p.o. after midnight        Ron Valentin MD

## 2022-06-28 NOTE — CONSULTS
nerves II-XII are grossly intact. Motor is  intact. Sensory is intact. ,  and gait is normal.    DATA:    CBC:   Recent Labs     06/27/22  1236   WBC 5.2   HGB 13.9       CMP:  Recent Labs     06/27/22  1236      K 4.3      CO2 26   BUN 12   CREATININE 0.7*   CALCIUM 9.2   PROT 7.3   LABALBU 4.1   BILITOT 0.6   ALKPHOS 60   AST 27   ALT 48*     Lipids:   Lab Results   Component Value Date    CHOL 169 01/24/2022    HDL 40 01/24/2022    TRIG 191 01/24/2022     Glucose:   Recent Labs     06/27/22  1138 06/27/22  1736 06/27/22  2115   POCGLU 237* 157* 195*     Hemoglobin A1C:   Lab Results   Component Value Date    LABA1C 9.9 06/27/2022     Free T4:   Lab Results   Component Value Date    T4FREE 1.3 01/24/2022     Free T3: No results found for: FT3  TSH High Sensitivity: No results found for: TSHHS    XR FOOT RIGHT (MIN 3 VIEWS)   Final Result   Suspected septic arthritis/osteomyelitis of the PIP joint of the 2nd toe with   lateral dislocation. Consider MRI. MRI FOOT RIGHT WO CONTRAST    (Results Pending)          Scheduled Medicines   Medications:    cefepime  2,000 mg IntraVENous Q8H    sodium chloride flush  5-40 mL IntraVENous 2 times per day    enoxaparin  40 mg SubCUTAneous BID    insulin lispro  0-6 Units SubCUTAneous TID     vancomycin  1,750 mg IntraVENous Q8H    insulin glargine  30 Units SubCUTAneous Nightly    [START ON 6/28/2022] insulin lispro  0-3 Units SubCUTAneous 2 times per day      Infusions:    sodium chloride      dextrose           IMPRESSION    Patient Active Problem List   Diagnosis    Type 2 diabetes mellitus without complication, without long-term current use of insulin ([facility])    Class 3 severe obesity without serious comorbidity with body mass index (BMI) of 40.0 to 44.9 in adult [facility])    Cellulitis of toe of right foot         RECOMMENDATIONS:      1. Reviewed POC blood glucose . Labs and X ray results   2.  Reviewed Home and Current Medicines 3. Will Start On meal/ Correction bolus Humalog/ Lantus Insulin regime   4. Monitor Blood glucose frequently   5. Modify  the dose of Insulin/ OHGD  as needed   6. Surgery being consulted ? ? Will follow with you  Again thank you for sharing pt's care with me.      Truly yours,       Olayinka Sidhu MD

## 2022-06-28 NOTE — ANESTHESIA PRE PROCEDURE
Department of Anesthesiology  Preprocedure Note       Name:  Darek Sample   Age:  42 y.o.  :  1985                                          MRN:  [id number]         Date:  2022      Surgeon: Nellie Lopez):  Ayla Warner MD    Procedure: Procedure(s):  TOE AMPUTATION LEFT 2ND TOE    Medications prior to admission:   Prior to Admission medications    Medication Sig Start Date End Date Taking? Authorizing Provider   glimepiride (AMARYL) 2 MG tablet Take 1 tablet by mouth every morning (before breakfast) 22   Matthew Leyva MD   metFORMIN (GLUCOPHAGE-XR) 500 MG extended release tablet Take 2 tablets by mouth 2 times daily 22   Allan Harrison MD   blood glucose monitor kit and supplies Use 3-4 times daily 22   Allan Harrison MD   blood glucose monitor strips Test 2-3 times a day & as needed for symptoms of irregular blood glucose.  22   Allan Harrison MD   Lancets MISC Use one lancet 2  times daily 22   Allan Harrison MD       Current medications:    Current Facility-Administered Medications   Medication Dose Route Frequency Provider Last Rate Last Admin    cefepime (MAXIPIME) 2000 mg IVPB minibag  2,000 mg IntraVENous Q8H SHAUNNA Suero CNP   Stopped at 22 1202    sodium chloride flush 0.9 % injection 5-40 mL  5-40 mL IntraVENous 2 times per day SHAUNNA Suero CNP        sodium chloride flush 0.9 % injection 5-40 mL  5-40 mL IntraVENous PRN SHAUNNA Suero CNP        0.9 % sodium chloride infusion   IntraVENous PRN SHAUNNA Suero CNP        ondansetron (ZOFRAN-ODT) disintegrating tablet 4 mg  4 mg Oral Q8H PRN SHAUNNA Suero CNP        Or    ondansetron (ZOFRAN) injection 4 mg  4 mg IntraVENous Q6H PRN Ainsley I SHAUNNA Everett CNP        polyethylene glycol (GLYCOLAX) packet 17 g  17 g Oral Daily PRN SHAUNNA Suero CNP        acetaminophen (TYLENOL) tablet 650 mg  650 mg Oral Q6H PRN Breezy Demarco, SHAUNNA - CNP        Or    acetaminophen (TYLENOL) suppository 650 mg  650 mg Rectal Q6H PRN SHAUNNA Suero - CNP        glucose chewable tablet 16 g  4 tablet Oral PRN Ainslye Everett, APRN - CNP        dextrose bolus 10% 125 mL  125 mL IntraVENous PRN Ainsley Everett APRN - CNP        Or    dextrose bolus 10% 250 mL  250 mL IntraVENous PRN Ainsley Everett, APRN - CNP        glucagon (rDNA) injection 1 mg  1 mg IntraMUSCular PRN Ainsley I Karlos, APRN - CNP        dextrose 5 % solution  100 mL/hr IntraVENous PRN Ainsley Everett, APRN - CNP        insulin lispro (HUMALOG) injection vial 0-6 Units  0-6 Units SubCUTAneous TID WC SHAUNNA Suero - CNP   3 Units at 06/28/22 1258    vancomycin (VANCOCIN) 1,750 mg in dextrose 5 % 500 mL IVPB  1,750 mg IntraVENous Q8H SHAUNNA Jeronimo - CNP   Stopped at 06/28/22 1006    insulin glargine (LANTUS) injection vial 30 Units  30 Units SubCUTAneous Nightly Yohan Mccullough MD   30 Units at 06/27/22 2200    insulin lispro (HUMALOG) injection vial 0-3 Units  0-3 Units SubCUTAneous 2 times per day Yohan Mccullough MD   1 Units at 06/28/22 0244       Allergies: Allergies   Allergen Reactions    Bactrim [Sulfamethoxazole-Trimethoprim] Rash       Problem List:    Patient Active Problem List   Diagnosis Code    Type 2 diabetes mellitus without complication, without long-term current use of insulin ([facility]) E11.9    Class 3 severe obesity without serious comorbidity with body mass index (BMI) of 40.0 to 44.9 in adult ([facility] E66.01, Z68.41    Cellulitis of toe of right foot L03.031    Uncontrolled type 2 diabetes mellitus with hyperglycemia ([facility] E11.65       Past Medical History:  History reviewed. No pertinent past medical history. Past Surgical History:  History reviewed. No pertinent surgical history.     Social History:    Social History     Tobacco Use    Smoking status: Never Smoker    Smokeless tobacco: Never Used   Substance Use Topics    Alcohol use: Never                                Counseling given: Not Answered      Vital Signs (Current):   Vitals:    06/27/22 1652 06/27/22 2112 06/28/22 0225 06/28/22 0815   BP:  (!) 145/81 133/86 124/70   Pulse: 74 84 78 70   Resp: 16 16 16 16   Temp: 37 °C (98.6 °F) (!) 35.8 °C (96.5 °F) 36.9 °C (98.5 °F) 36.6 °C (97.8 °F)   TempSrc: Oral Oral Oral Oral   SpO2: 99% 96% 98% 98%   Weight: (!) 400 lb 8 oz (181.7 kg)  (!) 400 lb (181.4 kg)    Height: 6' 8\" (2.032 m)                                                 BP Readings from Last 3 Encounters:   06/28/22 124/70   06/27/22 138/86   04/27/22 124/88       NPO Status:                                                                                 BMI:   Wt Readings from Last 3 Encounters:   06/28/22 (!) 400 lb (181.4 kg)   06/27/22 (!) 400 lb 1 oz (181.5 kg)   06/27/22 (!) 401 lb 6.4 oz (182.1 kg)     Body mass index is 43.94 kg/m².     CBC:   Lab Results   Component Value Date    WBC 5.1 06/28/2022    RBC 4.49 06/28/2022    HGB 12.7 06/28/2022    HCT 40.5 06/28/2022    MCV 90.2 06/28/2022    RDW 11.9 06/28/2022     06/28/2022       CMP:   Lab Results   Component Value Date     06/28/2022    K 4.3 06/28/2022     06/28/2022    CO2 29 06/28/2022    BUN 8 06/28/2022    CREATININE 0.8 06/28/2022    GFRAA >60 06/28/2022    AGRATIO 1.7 01/24/2022    LABGLOM >60 06/28/2022    GLUCOSE 207 06/28/2022    PROT 6.5 06/28/2022    CALCIUM 9.0 06/28/2022    BILITOT 0.9 06/28/2022    ALKPHOS 55 06/28/2022    AST 37 06/28/2022    ALT 55 06/28/2022       POC Tests:   Recent Labs     06/28/22  1135   POCGLU 256*       Coags: No results found for: PROTIME, INR, APTT    HCG (If Applicable): No results found for: PREGTESTUR, PREGSERUM, HCG, HCGQUANT     ABGs: No results found for: PHART, PO2ART, YPF8ZHG, BXK7HOB, BEART, B2EYSFMK     Type & Screen (If Applicable):  No results found for: LABABO, [address]    Drug/Infectious Status (If Applicable):  No results found for: HIV, HEPCAB    COVID-19 Screening (If Applicable): No results found for: COVID19        Anesthesia Evaluation  Patient summary reviewed  Airway: Mallampati: II  TM distance: >3 FB   Neck ROM: full  Mouth opening: > = 3 FB   Dental:          Pulmonary:normal exam                               Cardiovascular:                      Neuro/Psych:               GI/Hepatic/Renal:   (+) morbid obesity          Endo/Other:    (+) Diabetespoorly controlled, , .                 Abdominal:             Vascular: Other Findings:           Anesthesia Plan      MAC     ASA 4     (Chart review only 6/28/22)  Induction: intravenous. MIPS: Postoperative opioids intended. Anesthetic plan and risks discussed with patient. Plan discussed with CRNA.     Attending anesthesiologist reviewed and agrees with Pre Eval content                SHAUNNA Klein - CRNA   6/28/2022

## 2022-06-28 NOTE — PROGRESS NOTES
Dr Ivette Brennan saw patient this am told wound care no need for wound care consult that toe is not salvageable. Ok to d/c wound consult. Electronically signed by Howie Henao RN on 6/28/2022 at 11:07 AM

## 2022-06-28 NOTE — CONSULTS
Infectious Disease Consult Note  2022   Patient Name: María Lee : 1985   Impression   Right 2nd Toe Gangrene with OM and Septic Arthritis:    · Afebrile, no leukocytosis  · -BC 0/2-NGTD  · Pct 0.048, 0.040  · CRP 2.8, 2.1  · -XR Foot Right:Suspected septic arthritis/osteomyelitis of the PIP joint of the 2nd toe with   lateral dislocation. · -MRI Foot Right WO Contrast:  Findings consistent with acute septic arthritis and osteomyelitis at the 2nd   PIP joint with osteomyelitis involving nearly the entire middle and proximal   phalanges.  A soft tissue defect communicates with the PIP joint space. · -S/p per Dr. Crystal Pablo: Toe amputation right 2nd toe. DX: left second toe gangrene. Surgical pathology: Pending      DMII: Uncontrolled:  · Dr. Bo Jacques onboard  · -HbAIC: 9.3  · Newly diagnosed about     · Morbid Obesity:  BMI 43/94     Multi-morbidity: per PMHx:    Plan:   Continue IV cefepime 2 gm q8h   Continue IV vancomycin per pharmacy dosing   Continue IV Flagyl 500 mg q8h   Await surgical pathology to evaluate if residual OM to determine duration of ABX therapy  · Plan to treat empirically as no wound/surgical cultures obtained  · Ordered MRSA screen    Thank you for allowing me to consult in the care of this patient.  ------------------------  REASON FOR CONSULT: Infective syndrome, \"Osteomyelitis/septic arthritis of right 2nd toe in setting of uncontrolled DM II\"    Requested by: Ghulam Osman PA-C    HPI:Patient is a 42 y.o. -American male with a history of newly diagnosed DMII, morbid obesity, and an allergy to Bactrim (rash) who was admitted 2022 for further evaluation and management of erythema and edema to his right second toe which started about 4-5 weeks prior to admission. He underwent a plan XR of the right foot which revealed suspicion for septic arthritis /OM of the PIP joint of the 2nd toe with lateral dislocation.   He then underwent an MRI which revealed findings consistent with acute septic arthritis and osteomyelitis at the 2nd   PIP joint with osteomyelitis involving nearly the entire middle and proximal phalanges.  A soft tissue defect communicates with the PIP joint space. He was started on IV empiric ABX therapy of cefepime and vancomycin. ? Infectious diseases service was consulted to evaluate the pt, and recommend further investigative and therapeutic measures. ROS: Other systems reviewed Including eyes, ENT, respiratory, cardiovascular, GI, , dermatologic, neurologic, psych, hem/lymphatic, musculoskeletal and endocrine were negative other than what is mentioned above. Patient Active Problem List    Diagnosis Date Noted    Uncontrolled type 2 diabetes mellitus with hyperglycemia ([facility] 06/28/2022    Cellulitis of toe of right foot 06/27/2022    Type 2 diabetes mellitus without complication, without long-term current use of insulin ([facility] 01/19/2022    Class 3 severe obesity without serious comorbidity with body mass index (BMI) of 40.0 to 44.9 in adult [facility]) 01/19/2022     History reviewed. No pertinent past medical history. History reviewed. No pertinent surgical history. Family History   Problem Relation Age of Onset    Diabetes Maternal Grandmother       Infectious disease related family history - not contibutory. SOCIAL HISTORY  Social History     Tobacco Use    Smoking status: Never Smoker    Smokeless tobacco: Never Used   Substance Use Topics    Alcohol use: Never       Born: [address], [place] [address] Lives: [facility], alone   Occupation:    No recent travel of significance.  No recent unusual exposures.  NO pets  ? ALLERGIES  Allergies   Allergen Reactions    Bactrim [Sulfamethoxazole-Trimethoprim] Rash      MEDICATIONS  Reviewed and are per the chart/EMR. ?   Antibiotics:   Present:  Cefepime 6/27-  Vancomycin 6/27-  Metronidazole 6/28-    Past:    ?  -------------------------------------------------------------------------------------------------------------------    Vital Signs:  Vitals:    06/28/22 1341   BP: 130/73   Pulse: 83   Resp: 20   Temp: 98.7 °F (37.1 °C)   SpO2: 96%         Exam:    VS: noted; wt 400 lb (43.94 kg) Height 6'8\"  Gen: alert and oriented X3, no distress  Skin: no stigmata of endocarditis  Wounds: C/D/I dressing right foot, no surrounding erythema, edema or warmth noted  HEMT: AT/NC Oropharynx pink, moist, and without lesions or exudates; dentition in good state of repair  Eyes: PERRLA, EOMI, conjunctiva pink, sclera anicteric. Neck: Supple. Trachea midline. No LAD. Chest: no distress and CTA. Good air movement. Room air. Heart: RRR and no MRG. Abd: soft, non-distended, no tenderness, no hepatomegaly. Normoactive bowel sounds. Ext: no clubbing, cyanosis, or edema. See wounds above. Neuro: Mental status intact. CN 2-12 intact and no focal sensory or motor deficits    ? Diagnostic Studies: reviewed  6/27/22 XR Foot Right:  Impression   Suspected septic arthritis/osteomyelitis of the PIP joint of the 2nd toe with   lateral dislocation.  Consider MRI.     6/28/22 MRI Foot Right WO Contrast:  Impression   Findings consistent with acute septic arthritis and osteomyelitis at the 2nd   PIP joint with osteomyelitis involving nearly the entire middle and proximal   phalanges.  A soft tissue defect communicates with the PIP joint space. ??  I have examined this patient and available medical records on this date and have made the above observations, conclusions and recommendations. Electronically signed by: Electronically signed by SHAUNNA De Anda CNP on 6/28/2022 at 4:01 PM

## 2022-06-28 NOTE — PROGRESS NOTES
Hospitalist Progress Note      Name:  Doug Cain /Age/Sex: 1985  (42 y.o. male)   MRN & CSN:  [id number] & [id number] Admission Date/Time: 2022 12:01 PM   Location:  [address] [place] [address] PCP: Annmarie Alvarez MD         Hospital Day: 2                                               Attending Physician Dr. Imer Rose and Plan:   Doug Cain is a 42 y.o.  male  who presents with Cellulitis of toe of right foot      Acute septic arthritis/osteomyelitis of right second toe  o No SIRS/sepsis. Progressive toe swelling for 1 month. XR concerning for arthritis/osteomyelitis. MRI confirms findings. Patient afebrile, no leukocytosis, procalcitonin low, lactic acid normal  o Wound care consulted   o General surgery consulted  o Start vancomycin and cefepime  o Blood cultures pending  o Wound culture pending  o Trend CBC     Obesity Body mass index is 43.94 kg/m². o Recommended lifestyle modifications     Uncontrolled DM II with associated peripheral neuropathy  o Lantus 30 U subcutaneous, SSI, hypoglycemic protocol  o Hemoglobin A1C 9.3 on 22  o Lifestyle modifications      Diet ADULT DIET; Regular; 5 carb choices (75 gm/meal)   DVT Prophylaxis [x] Lovenox, []  Heparin, [] SCDs, [] Ambulation   GI Prophylaxis [] PPI,  [] H2 Blocker,  [] Carafate,  [] Diet/Tube Feeds   Code Status Full Code   Disposition Patient requires continued admission due to MRI of foot and general surgery consult     -Patient assessment and plan discussed with supervising physician-  Overnight events:     Doug Cain is a 42 y.o.  male, who presented with Abscess (right 2nd toe)    Patient was seen and evaluated at bedside by myself. No acute overnight events. Patient reports feeling no new symptoms.     Objective:   No intake or output data in the 24 hours ending 22 1113   Vitals:   Vitals:    22 1652 22 2112 22 0225 22 0815   BP:  (!) 145/81 133/86 124/70   Pulse: 74 84 78 70   Resp: 16 16 16 16 Temp: 98.6 °F (37 °C) (!) 96.5 °F (35.8 °C) 98.5 °F (36.9 °C) 97.8 °F (36.6 °C)   TempSrc: Oral Oral Oral Oral   SpO2: 99% 96% 98% 98%   Weight: (!) 400 lb 8 oz (181.7 kg)  (!) 400 lb (181.4 kg)    Height: 6' 8\" (2.032 m)        Physical Exam: 06/28/22     General: NAD. Obese  Eyes: EOMI  ENT: neck supple  Cardiovascular: Regular rate  Respiratory: Clear to auscultation  Gastrointestinal: Soft, non tender  Genitourinary: no suprapubic tenderness  Musculoskeletal: No edema. Right front 2nd toe with skin ulceration and drainage present. Decreased sensation to bilateral feet, chronic  Skin: warm, dry  Neuro: Alert. Psych: Mood appropriate. Medications:   Medications:    cefepime  2,000 mg IntraVENous Q8H    sodium chloride flush  5-40 mL IntraVENous 2 times per day    enoxaparin  40 mg SubCUTAneous BID    insulin lispro  0-6 Units SubCUTAneous TID WC    vancomycin  1,750 mg IntraVENous Q8H    insulin glargine  30 Units SubCUTAneous Nightly    insulin lispro  0-3 Units SubCUTAneous 2 times per day      Infusions:    sodium chloride      dextrose       PRN Meds: sodium chloride flush, 5-40 mL, PRN  sodium chloride, , PRN  ondansetron, 4 mg, Q8H PRN   Or  ondansetron, 4 mg, Q6H PRN  polyethylene glycol, 17 g, Daily PRN  acetaminophen, 650 mg, Q6H PRN   Or  acetaminophen, 650 mg, Q6H PRN  glucose, 4 tablet, PRN  dextrose bolus, 125 mL, PRN   Or  dextrose bolus, 250 mL, PRN  glucagon (rDNA), 1 mg, PRN  dextrose, 100 mL/hr, PRN        LABS  CBC   Recent Labs     06/27/22  1236 06/28/22  0646   WBC 5.2 5.1   HGB 13.9 12.7*   HCT 43.4 40.5*    150      RENAL  Recent Labs     06/27/22  1236 06/28/22  0646    139   K 4.3 4.3    100   CO2 26 29   BUN 12 8   CREATININE 0.7* 0.8*     LFT'S  Recent Labs     06/27/22  1236 06/28/22  0646   AST 27 37   ALT 48* 55*   BILITOT 0.6 0.9   ALKPHOS 60 55     COAG  No results for input(s): INR in the last 72 hours.   CARDIAC ENZYMES  No results for input(s): CKTOTAL, CKMB, CKMBINDEX, TROPONINI in the last 72 hours. Radiology this visit:  Reviewed. XR FOOT RIGHT (MIN 3 VIEWS)    Result Date: 6/27/2022  EXAMINATION: THREE XRAY VIEWS OF THE RIGHT FOOT 6/27/2022 12:56 pm COMPARISON: None. HISTORY: ORDERING SYSTEM PROVIDED HISTORY: 2nd toe pain. swelling TECHNOLOGIST PROVIDED HISTORY: Reason for exam:->2nd toe pain. swelling Reason for Exam: pain and swelling 2 toe      ` FINDINGS: Focal osteopenia and irregularity of the PIP joint of the 2nd toe with lateral dislocation at that joint. There is associated soft tissue swelling. Moderate hallux valgus and mild great toe MTP osteoarthritis. Bony mineralization is otherwise within normal limits. Suspected septic arthritis/osteomyelitis of the PIP joint of the 2nd toe with lateral dislocation. Consider MRI. MRI FOOT RIGHT WO CONTRAST    Result Date: 6/28/2022  EXAMINATION: MRI OF THE RIGHT FOOT WITHOUT CONTRAST, 6/28/2022 8:48 am TECHNIQUE: Multiplanar multisequence MRI of the right foot was performed without the administration of intravenous contrast. COMPARISON: Radiographs dated 06/27/2022 HISTORY: ORDERING SYSTEM PROVIDED HISTORY:  Cellulitis right 2nd toe with concern for septic arthritis/osteomyelitis. TECHNOLOGIST PROVIDED HISTORY: Reason for Exam:  Cellulitis right 2nd toe with concern for septic arthritis/ osteomyelitis. Relevant Medical/Surgical History:  None FINDINGS: LISFRANC JOINT: The Lisfranc ligament is intact. Midfoot alignment is within normal limits. There is a small focus of subchondral edema within the central cuneiform. BONE MARROW: There is T1 marrow replacement throughout the 2nd proximal phalanx with minimal sparing of the base. There is destructive change at the 1st IP joint with an associated soft tissue defect communicating with the joint space. There is T1 marrow replacement involving the base, shaft and neck of the 2nd middle phalanx with corresponding T2 hyperintensity.   There is T2 hyperintensity throughout the proximal phalanx. There is fluid at the PIP joint. The marrow signal of the distal phalanx is preserved. There is no additional osteomyelitis. GREATER AND LESSER MTP JOINTS: Severe hallux valgus deformity is noted. There is mild 1st MTP cartilage thinning diffusely. Mild bunion formation is noted with minimal cystic change. SOFT TISSUES:  There is a small amount of fluid in the 3rd intermetatarsal bursa. There is edema within the intrinsic foot musculature. TENDONS: There is attenuation of the 3rd extensor tendon at the level of the PIP joint. The flexor tendons are intact. Findings consistent with acute septic arthritis and osteomyelitis at the 2nd PIP joint with osteomyelitis involving nearly the entire middle and proximal phalanges. A soft tissue defect communicates with the PIP joint space.        Alison Duran PA-C  Hospitalist  6/28/2022, 11:13 AM

## 2022-06-29 ENCOUNTER — ANESTHESIA (OUTPATIENT)
Dept: OPERATING ROOM | Age: 37
DRG: 617 | End: 2022-06-29
Payer: COMMERCIAL

## 2022-06-29 LAB
CREAT SERPL-MCNC: 0.8 MG/DL (ref 0.9–1.3)
DOSE AMOUNT: ABNORMAL
DOSE TIME: ABNORMAL
GFR AFRICAN AMERICAN: >60 ML/MIN/1.73M2
GFR NON-AFRICAN AMERICAN: >60 ML/MIN/1.73M2
GLUCOSE BLD-MCNC: 183 MG/DL (ref 70–99)
GLUCOSE BLD-MCNC: 187 MG/DL (ref 70–99)
GLUCOSE BLD-MCNC: 199 MG/DL (ref 70–99)
GLUCOSE BLD-MCNC: 244 MG/DL (ref 70–99)
GLUCOSE BLD-MCNC: 249 MG/DL (ref 70–99)
VANCOMYCIN TROUGH: 22.3 UG/ML (ref 10–20)

## 2022-06-29 PROCEDURE — APPSS60 APP SPLIT SHARED TIME 46-60 MINUTES: Performed by: NURSE PRACTITIONER

## 2022-06-29 PROCEDURE — 0Y6S0Z0 DETACHMENT AT LEFT 2ND TOE, COMPLETE, OPEN APPROACH: ICD-10-PCS | Performed by: SURGERY

## 2022-06-29 PROCEDURE — APPNB45 APP NON BILLABLE 31-45 MINUTES: Performed by: PHYSICIAN ASSISTANT

## 2022-06-29 PROCEDURE — 6370000000 HC RX 637 (ALT 250 FOR IP): Performed by: PHYSICIAN ASSISTANT

## 2022-06-29 PROCEDURE — 99223 1ST HOSP IP/OBS HIGH 75: CPT | Performed by: INTERNAL MEDICINE

## 2022-06-29 PROCEDURE — 6360000002 HC RX W HCPCS: Performed by: PHYSICIAN ASSISTANT

## 2022-06-29 PROCEDURE — 36415 COLL VENOUS BLD VENIPUNCTURE: CPT

## 2022-06-29 PROCEDURE — 82962 GLUCOSE BLOOD TEST: CPT

## 2022-06-29 PROCEDURE — 1200000000 HC SEMI PRIVATE

## 2022-06-29 PROCEDURE — 28820 AMPUTATION OF TOE: CPT | Performed by: SURGERY

## 2022-06-29 PROCEDURE — 94761 N-INVAS EAR/PLS OXIMETRY MLT: CPT

## 2022-06-29 PROCEDURE — 80202 ASSAY OF VANCOMYCIN: CPT

## 2022-06-29 PROCEDURE — 2709999900 HC NON-CHARGEABLE SUPPLY: Performed by: SURGERY

## 2022-06-29 PROCEDURE — 2500000003 HC RX 250 WO HCPCS: Performed by: NURSE ANESTHETIST, CERTIFIED REGISTERED

## 2022-06-29 PROCEDURE — 2580000003 HC RX 258: Performed by: PHYSICIAN ASSISTANT

## 2022-06-29 PROCEDURE — 3700000001 HC ADD 15 MINUTES (ANESTHESIA): Performed by: SURGERY

## 2022-06-29 PROCEDURE — 88311 DECALCIFY TISSUE: CPT

## 2022-06-29 PROCEDURE — 3700000000 HC ANESTHESIA ATTENDED CARE: Performed by: SURGERY

## 2022-06-29 PROCEDURE — 2580000003 HC RX 258: Performed by: NURSE ANESTHETIST, CERTIFIED REGISTERED

## 2022-06-29 PROCEDURE — 82565 ASSAY OF CREATININE: CPT

## 2022-06-29 PROCEDURE — 2500000003 HC RX 250 WO HCPCS: Performed by: PHYSICIAN ASSISTANT

## 2022-06-29 PROCEDURE — 6370000000 HC RX 637 (ALT 250 FOR IP): Performed by: INTERNAL MEDICINE

## 2022-06-29 PROCEDURE — 99999 PR OFFICE/OUTPT VISIT,PROCEDURE ONLY: CPT | Performed by: PHYSICIAN ASSISTANT

## 2022-06-29 PROCEDURE — 6360000002 HC RX W HCPCS: Performed by: NURSE ANESTHETIST, CERTIFIED REGISTERED

## 2022-06-29 PROCEDURE — 3600000012 HC SURGERY LEVEL 2 ADDTL 15MIN: Performed by: SURGERY

## 2022-06-29 PROCEDURE — 3600000002 HC SURGERY LEVEL 2 BASE: Performed by: SURGERY

## 2022-06-29 PROCEDURE — 88305 TISSUE EXAM BY PATHOLOGIST: CPT

## 2022-06-29 PROCEDURE — 2500000003 HC RX 250 WO HCPCS: Performed by: SURGERY

## 2022-06-29 PROCEDURE — 2500000003 HC RX 250 WO HCPCS: Performed by: NURSE PRACTITIONER

## 2022-06-29 RX ORDER — PROPOFOL 10 MG/ML
INJECTION, EMULSION INTRAVENOUS CONTINUOUS PRN
Status: DISCONTINUED | OUTPATIENT
Start: 2022-06-29 | End: 2022-06-29 | Stop reason: SDUPTHER

## 2022-06-29 RX ORDER — SODIUM CHLORIDE 9 MG/ML
INJECTION, SOLUTION INTRAVENOUS CONTINUOUS PRN
Status: DISCONTINUED | OUTPATIENT
Start: 2022-06-29 | End: 2022-06-29 | Stop reason: SDUPTHER

## 2022-06-29 RX ORDER — KETAMINE HCL 50MG/ML(1)
SYRINGE (ML) INTRAVENOUS PRN
Status: DISCONTINUED | OUTPATIENT
Start: 2022-06-29 | End: 2022-06-29 | Stop reason: SDUPTHER

## 2022-06-29 RX ORDER — MIDAZOLAM HYDROCHLORIDE 1 MG/ML
INJECTION INTRAMUSCULAR; INTRAVENOUS PRN
Status: DISCONTINUED | OUTPATIENT
Start: 2022-06-29 | End: 2022-06-29 | Stop reason: SDUPTHER

## 2022-06-29 RX ORDER — ENOXAPARIN SODIUM 100 MG/ML
40 INJECTION SUBCUTANEOUS DAILY
Status: DISCONTINUED | OUTPATIENT
Start: 2022-06-30 | End: 2022-06-29 | Stop reason: DRUGHIGH

## 2022-06-29 RX ORDER — PROPOFOL 10 MG/ML
INJECTION, EMULSION INTRAVENOUS PRN
Status: DISCONTINUED | OUTPATIENT
Start: 2022-06-29 | End: 2022-06-29 | Stop reason: SDUPTHER

## 2022-06-29 RX ORDER — ENOXAPARIN SODIUM 100 MG/ML
60 INJECTION SUBCUTANEOUS 2 TIMES DAILY
Status: DISCONTINUED | OUTPATIENT
Start: 2022-06-30 | End: 2022-07-01 | Stop reason: HOSPADM

## 2022-06-29 RX ORDER — BUPIVACAINE HYDROCHLORIDE 5 MG/ML
INJECTION, SOLUTION EPIDURAL; INTRACAUDAL
Status: COMPLETED | OUTPATIENT
Start: 2022-06-29 | End: 2022-06-29

## 2022-06-29 RX ADMIN — VANCOMYCIN HYDROCHLORIDE 1500 MG: 1 INJECTION, POWDER, LYOPHILIZED, FOR SOLUTION INTRAVENOUS at 05:49

## 2022-06-29 RX ADMIN — SODIUM CHLORIDE: 9 INJECTION, SOLUTION INTRAVENOUS at 07:31

## 2022-06-29 RX ADMIN — INSULIN LISPRO 1 UNITS: 100 INJECTION, SOLUTION INTRAVENOUS; SUBCUTANEOUS at 01:34

## 2022-06-29 RX ADMIN — METRONIDAZOLE 500 MG: 500 INJECTION, SOLUTION INTRAVENOUS at 17:50

## 2022-06-29 RX ADMIN — VANCOMYCIN HYDROCHLORIDE 1500 MG: 1 INJECTION, POWDER, LYOPHILIZED, FOR SOLUTION INTRAVENOUS at 22:19

## 2022-06-29 RX ADMIN — INSULIN LISPRO 2 UNITS: 100 INJECTION, SOLUTION INTRAVENOUS; SUBCUTANEOUS at 12:53

## 2022-06-29 RX ADMIN — PROPOFOL 80 MCG/KG/MIN: 10 INJECTION, EMULSION INTRAVENOUS at 07:43

## 2022-06-29 RX ADMIN — INSULIN GLARGINE 30 UNITS: 100 INJECTION, SOLUTION SUBCUTANEOUS at 20:44

## 2022-06-29 RX ADMIN — CEFEPIME 2000 MG: 2 INJECTION, POWDER, FOR SOLUTION INTRAVENOUS at 17:41

## 2022-06-29 RX ADMIN — MIDAZOLAM 2 MG: 1 INJECTION INTRAMUSCULAR; INTRAVENOUS at 07:31

## 2022-06-29 RX ADMIN — INSULIN LISPRO 1 UNITS: 100 INJECTION, SOLUTION INTRAVENOUS; SUBCUTANEOUS at 20:43

## 2022-06-29 RX ADMIN — INSULIN LISPRO 1 UNITS: 100 INJECTION, SOLUTION INTRAVENOUS; SUBCUTANEOUS at 17:53

## 2022-06-29 RX ADMIN — Medication 25 MG: at 07:40

## 2022-06-29 RX ADMIN — METRONIDAZOLE 500 MG: 500 INJECTION, SOLUTION INTRAVENOUS at 09:00

## 2022-06-29 RX ADMIN — METRONIDAZOLE 500 MG: 500 INJECTION, SOLUTION INTRAVENOUS at 00:24

## 2022-06-29 RX ADMIN — PROPOFOL 50 MG: 10 INJECTION, EMULSION INTRAVENOUS at 07:40

## 2022-06-29 RX ADMIN — CEFEPIME 2000 MG: 2 INJECTION, POWDER, FOR SOLUTION INTRAVENOUS at 10:38

## 2022-06-29 RX ADMIN — VANCOMYCIN HYDROCHLORIDE 1500 MG: 1 INJECTION, POWDER, LYOPHILIZED, FOR SOLUTION INTRAVENOUS at 15:35

## 2022-06-29 RX ADMIN — SODIUM CHLORIDE, PRESERVATIVE FREE 10 ML: 5 INJECTION INTRAVENOUS at 20:38

## 2022-06-29 ASSESSMENT — PAIN - FUNCTIONAL ASSESSMENT: PAIN_FUNCTIONAL_ASSESSMENT: NONE - DENIES PAIN

## 2022-06-29 NOTE — ANESTHESIA POSTPROCEDURE EVALUATION
Department of Anesthesiology  Postprocedure Note    Patient: Jay Gonzalez  MRN: [id number]  YOB: 1985  Date of evaluation: 6/29/2022      Procedure Summary     Date: 06/29/22 Room / Location: [address] [place] [address] OR [address] [place] [address]    Anesthesia Start: 0730 Anesthesia Stop: 9737    Procedure: TOE AMPUTATION RIGHT 2ND TOE (Right Second Toe) Diagnosis:       Gangrene ([address]      (LEFT SECOND TOE GANGRENE)    Surgeons: Yumiko Wellington MD Responsible Provider: Jose Leach MD    Anesthesia Type: MAC ASA Status: 4          Anesthesia Type: MAC    [place] Phase I:      [place] Phase II:        Anesthesia Post Evaluation    Patient location during evaluation: floor  Patient participation: complete - patient participated  Level of consciousness: awake  Pain score: 0  Airway patency: patent  Nausea & Vomiting: no nausea and no vomiting  Complications: no  Cardiovascular status: blood pressure returned to baseline and hemodynamically stable  Respiratory status: acceptable  Hydration status: stable  Multimodal analgesia pain management approach

## 2022-06-29 NOTE — PROGRESS NOTES
[facility] HOSPITALIST PROGRESS NOTE      PCP: Navin Queen MD    Date of Admission: 6/27/2022    Subjective:  Pain world control     Brief Hospital summary patient is a 77-year-old male with history of diabetes mellitus who was admitted with right foot cellulitis. X-ray foot showed arthritis/osteomyelitis of second toe with lateral dislocation, vancomycin and cefepime started, surgery consulted  MRI was consistent with osteomyelitis,    Vitals signs:  Afebrile, heart rate 77, blood pressure 136/89, on room air    Medications: Cefepime, Lantus, sliding scale insulin, Flagyl, vancomycin    Antibiotics: Cefepime and vancomycin    Fluid status: Not documented    Labs:   Creatinine 0.8    Imaging: MRI shows finding consistent with acute septic arthritis and osteomyelitis of second PIP joint with osteomyelitis involving the ring there entirely middle and proximal phalanges    Assessment/Plan:     Second toe foot septic arthritis/osteomyelitis:   MRI shows septic arthritis and osteomyelitis of second toe  Surgery consulted  I&D today  Continue Vanco and cefepime, will await cultures    Morbid obesity: BMI 46.7, complicates all care    Diabetes mellitus type 2 with peripheral neuropathy:  Lantus, sliding scale insulin, last hemoglobin A1c 9.3  Glucose 199-249 range    DVT prophlaxis:   On Lovenox    Physical Exam Performed:       /89   Pulse 79   Temp 98.5 °F (36.9 °C) (Tympanic)   Resp 18   Ht 6' 8\" (2.032 m)   Wt (!) 400 lb (181.4 kg)   SpO2 96%   BMI 43.94 kg/m²     Physical Exam  Constitutional:       General: He is not in acute distress. Appearance: Normal appearance. HENT:      Head: Normocephalic and atraumatic. Right Ear: External ear normal.      Left Ear: External ear normal.   Eyes:      Extraocular Movements: Extraocular movements intact. Pupils: Pupils are equal, round, and reactive to light. Cardiovascular:      Rate and Rhythm: Normal rate and regular rhythm. Heart sounds: No murmur heard. Pulmonary:      Effort: Pulmonary effort is normal. No respiratory distress. Breath sounds: Normal breath sounds. No wheezing. Abdominal:      General: Bowel sounds are normal. There is no distension. Palpations: Abdomen is soft. Tenderness: There is no abdominal tenderness. Musculoskeletal:         General: No swelling. Cervical back: Normal range of motion. Skin:     General: Skin is warm. Neurological:      General: No focal deficit present. Mental Status: He is alert and oriented to person, place, and time. Cranial Nerves: No cranial nerve deficit. Psychiatric:         Mood and Affect: Mood normal.         Labs:   Recent Labs     06/27/22  1236 06/28/22  0646   WBC 5.2 5.1   HGB 13.9 12.7*   HCT 43.4 40.5*    150     Recent Labs     06/27/22  1236 06/28/22  0646 06/29/22  0621    139  --    K 4.3 4.3  --     100  --    CO2 26 29  --    BUN 12 8  --    CREATININE 0.7* 0.8* 0.8*   CALCIUM 9.2 9.0  --      Recent Labs     06/27/22  1236 06/28/22  0646   AST 27 37   ALT 48* 55*   BILITOT 0.6 0.9   ALKPHOS 60 55     No results for input(s): INR in the last 72 hours. No results for input(s): Kathyrn Belch in the last 72 hours. Urinalysis:      Lab Results   Component Value Date    NITRU NEGATIVE 06/27/2022    WBCUA <1 06/27/2022    BACTERIA RARE 06/27/2022    RBCUA NONE SEEN 06/27/2022    BLOODU NEGATIVE 06/27/2022    SPECGRAV <1.005 06/27/2022       Radiology:  MRI FOOT RIGHT WO CONTRAST   Final Result   Findings consistent with acute septic arthritis and osteomyelitis at the 2nd   PIP joint with osteomyelitis involving nearly the entire middle and proximal   phalanges. A soft tissue defect communicates with the PIP joint space. XR FOOT RIGHT (MIN 3 VIEWS)   Final Result   Suspected septic arthritis/osteomyelitis of the PIP joint of the 2nd toe with   lateral dislocation. Consider MRI. Bhavik Medina MD  6/29/2022 8:35 AM

## 2022-06-29 NOTE — PROGRESS NOTES
[id number]  consulted by KELLIE Victor  for monitoring and adjustment. Indication for treatment: Bone and joint infection  Goal trough: [] 10-15 mcg/mL or [x] 15-20 mcg/ml  AUC/LARISA: [] <500 or [x] 400-600    Pertinent Laboratory Values:   Temp Readings from Last 3 Encounters:   06/29/22 98.5 °F (36.9 °C) (Tympanic)     Recent Labs     06/27/22  1236 06/28/22  0646   WBC 5.2 5.1     Recent Labs     06/27/22  1236 06/28/22  0646 06/29/22  0621   BUN 12 8  --    CREATININE 0.7* 0.8* 0.8*     Estimated Creatinine Clearance: 228 mL/min (A) (based on SCr of 0.8 mg/dL (L)). Intake/Output Summary (Last 24 hours) at 6/29/2022 0948  Last data filed at 6/29/2022 0008  Gross per 24 hour   Intake 787.23 ml   Output --   Net 787.23 ml       Pertinent Cultures:  Date    Source    Results  6/28               Blood    No growth  6/28               Urine    No growth    Vancomycin level:   TROUGH:    Recent Labs     06/29/22  0621   VANCOTROUGH 22.3*     RANDOM:  No results for input(s): VANCORANDOM in the last 72 hours. Assessment:  · SCr, BUN, and urine output: SCR appears  to baseline @0.8, no UOP data  · Day(s) of therapy: 2 (duration TBD)  · Vancomycin concentration:   · 6/29 - 22.3 ()    Plan:  · Continue vancomycin 1500mg IVPB every 8 hours  · Check the vanco level 7/1 AM.  · Will follow renal trends closely and monitor for accumulation 2/2 BMI. · Pharmacy will continue to monitor patient and adjust therapy as indicated    Francisco 3 7/01 @05:00    Thank you for the consult.   Alfie Chapin [facility]  6/29/2022 9:48 AM

## 2022-06-29 NOTE — PROGRESS NOTES
Progress Note( Dr. Moreno Para)  6/28/2022  Subjective:   Admit Date: 6/27/2022  PCP: Wilner Tate MD    Admitted For :Cellulitis of right second toe    Consulted For: Better control of glucose    Interval History: feels better  Started on antibiotics   Surgery was consulted     Denies any chest pains,   Denies SOB . Denies nausea or vomiting. No new bowel or bladder symptoms. No intake or output data in the 24 hours ending 06/28/22 2238    DATA    CBC: Recent Labs     06/27/22  1236 06/28/22  0646   WBC 5.2 5.1   HGB 13.9 12.7*    150    CMP:  Recent Labs     06/27/22  1236 06/28/22  0646    139   K 4.3 4.3    100   CO2 26 29   BUN 12 8   CREATININE 0.7* 0.8*   CALCIUM 9.2 9.0   PROT 7.3 6.5   LABALBU 4.1 4.1   BILITOT 0.6 0.9   ALKPHOS 60 55   AST 27 37   ALT 48* 55*     Lipids:   Lab Results   Component Value Date    CHOL 169 01/24/2022    HDL 40 01/24/2022    TRIG 191 01/24/2022     Glucose:  Recent Labs     06/28/22  1135 06/28/22  1757 06/28/22  1929   POCGLU 256* 195* 238*     [id number]:  Lab Results   Component Value Date    LABA1C 9.3 06/28/2022     High Sensitivity TSH: No results found for: TSHHS  Free T3: No results found for: FT3  Free T4:  Lab Results   Component Value Date    T4FREE 1.3 01/24/2022       MRI FOOT RIGHT WO CONTRAST   Final Result   Findings consistent with acute septic arthritis and osteomyelitis at the 2nd   PIP joint with osteomyelitis involving nearly the entire middle and proximal   phalanges. A soft tissue defect communicates with the PIP joint space. XR FOOT RIGHT (MIN 3 VIEWS)   Final Result   Suspected septic arthritis/osteomyelitis of the PIP joint of the 2nd toe with   lateral dislocation. Consider MRI.               Scheduled Medicines   Medications:    vancomycin  1,500 mg IntraVENous Q8H    metroNIDAZOLE  500 mg IntraVENous Q8H    cefepime  2,000 mg IntraVENous Q8H    sodium chloride flush  5-40 mL IntraVENous 2 times per day    insulin lispro  0-6 Units SubCUTAneous TID     insulin glargine  30 Units SubCUTAneous Nightly    insulin lispro  0-3 Units SubCUTAneous 2 times per day      Infusions:    sodium chloride      dextrose           Objective:   Vitals: /73   Pulse 83   Temp 98.7 °F (37.1 °C) (Oral)   Resp 20   Ht 6' 8\" (2.032 m)   Wt (!) 400 lb (181.4 kg)   SpO2 96%   BMI 43.94 kg/m²   General appearance: alert and cooperative with exam  Neck: no JVD or bruit  Thyroid : Normal lobes   Lungs: Has Vesicular Breath sounds   Heart:  regular rate and rhythm  Abdomen: soft, non-tender; bowel sounds normal; no masses,  no organomegaly  Musculoskeletal: Normal  Extremities: extremities normal, , no edema  Right 2 nd toe cellulitis ? ?losteomyelitis   Neurologic:  Awake, alert, oriented to name, place and time. Cranial nerves II-XII are grossly intact. Motor is  intact. Sensory is intact. ,  and gait is normal.    Assessment:     Patient Active Problem List:     Type 2 diabetes mellitus without complication, without long-term current use of insulin ([facility])     Class 3 severe obesity without serious comorbidity with body mass index (BMI) of 40.0 to 44.9 in adult [facility])     Cellulitis of toe of right foot     Uncontrolled type 2 diabetes mellitus with hyperglycemia ([facility] [address]      Plan:     1. Reviewed POC blood glucose . Labs and X ray results   2. Reviewed Current Medicines   3. On meal/ Correction bolus Humalog/ Basal Lantus Insulin regime   4. Monitor Blood glucose frequently   5. Modified  the dose of Insulin/ other medicines as needed   6. Will follow     .      Adolfo Gauthier MD, MD

## 2022-06-29 NOTE — CARE COORDINATION
Chart reviewed and screened for possible needs at discharge. Pt lives at home and was independent PTA. Per nursing charting, pt remains independent with ambulation in hospital room. Pt has PCP and insurance to help w/ RX's. CM available for possible needs at discharge. Per nursing update in IDR, pt will be transitioned to oral ATB and will not need IV ATB at discharge.

## 2022-06-29 NOTE — BRIEF OP NOTE
Brief Postoperative Note      Patient: Maria Antonia Obregon  YOB: 1985  MRN: [id number]    Date of Procedure: 6/29/2022    Pre-Op Diagnosis: LEFT SECOND TOE GANGRENE    Post-Op Diagnosis: Same       Procedure(s):  TOE AMPUTATION RIGHT 2ND TOE    Surgeon(s):  Reza Henley MD    Assistant:  * No surgical staff found *    Anesthesia: Monitor Anesthesia Care    Estimated Blood Loss (mL): less than 50     Complications: None    Specimens:   ID Type Source Tests Collected by Time Destination   A : Right 2nd Toe Specimen Toe SURGICAL PATHOLOGY Reza Henley MD 6/29/2022 0800        Implants:  * No implants in log *      Drains: * No LDAs found *    Findings: As Above    Electronically signed by Alysha Castro PA-C on 6/29/2022 at 8:20 AM

## 2022-06-29 NOTE — PROGRESS NOTES
Progress Note( Dr. Edwards Senior)  6/29/2022  Subjective:   Admit Date: 6/27/2022  PCP: Kira Mata MD    Admitted For :Cellulitis of right second toe    Consulted For: Better control of glucose    Interval History: feels better     on antibiotics   Patient amputated of the right second toe 6/29/2020      Denies any chest pains,   Denies SOB . Denies nausea or vomiting. No new bowel or bladder symptoms. Intake/Output Summary (Last 24 hours) at 6/29/2022 0711  Last data filed at 6/29/2022 0008  Gross per 24 hour   Intake 787.23 ml   Output --   Net 787.23 ml       DATA    CBC:   Recent Labs     06/27/22  1236 06/28/22  0646   WBC 5.2 5.1   HGB 13.9 12.7*    150    CMP:  Recent Labs     06/27/22  1236 06/28/22  0646    139   K 4.3 4.3    100   CO2 26 29   BUN 12 8   CREATININE 0.7* 0.8*   CALCIUM 9.2 9.0   PROT 7.3 6.5   LABALBU 4.1 4.1   BILITOT 0.6 0.9   ALKPHOS 60 55   AST 27 37   ALT 48* 55*     Lipids:   Lab Results   Component Value Date    CHOL 169 01/24/2022    HDL 40 01/24/2022    TRIG 191 01/24/2022     Glucose:  Recent Labs     06/28/22  1757 06/28/22  1929 06/29/22  0133   POCGLU 195* 238* 199*     [id number]:  Lab Results   Component Value Date    LABA1C 9.3 06/28/2022     High Sensitivity TSH: No results found for: TSHHS  Free T3: No results found for: FT3  Free T4:  Lab Results   Component Value Date    T4FREE 1.3 01/24/2022       MRI FOOT RIGHT WO CONTRAST   Final Result   Findings consistent with acute septic arthritis and osteomyelitis at the 2nd   PIP joint with osteomyelitis involving nearly the entire middle and proximal   phalanges. A soft tissue defect communicates with the PIP joint space. XR FOOT RIGHT (MIN 3 VIEWS)   Final Result   Suspected septic arthritis/osteomyelitis of the PIP joint of the 2nd toe with   lateral dislocation. Consider MRI.               Scheduled Medicines   Medications:    vancomycin  1,500 mg IntraVENous Q8H    metroNIDAZOLE 500 mg IntraVENous Q8H    cefepime  2,000 mg IntraVENous Q8H    sodium chloride flush  5-40 mL IntraVENous 2 times per day    insulin lispro  0-6 Units SubCUTAneous TID WC    insulin glargine  30 Units SubCUTAneous Nightly    insulin lispro  0-3 Units SubCUTAneous 2 times per day      Infusions:    sodium chloride      dextrose           Objective:   Vitals: /89   Pulse 79   Temp 98.5 °F (36.9 °C) (Tympanic)   Resp 18   Ht 6' 8\" (2.032 m)   Wt (!) 400 lb (181.4 kg)   SpO2 96%   BMI 43.94 kg/m²   General appearance: alert and cooperative with exam  Neck: no JVD or bruit  Thyroid : Normal lobes   Lungs: Has Vesicular Breath sounds   Heart:  regular rate and rhythm  Abdomen: soft, non-tender; bowel sounds normal; no masses,  no organomegaly  Musculoskeletal: Normal  Extremities: extremities normal, , no edema  Right 2 nd toe cellulitis ? ?cj had amputation of right second toe on 6/29/2022  Neurologic:  Awake, alert, oriented to name, place and time. Cranial nerves II-XII are grossly intact. Motor is  intact. Sensory is intact. ,  and gait is normal.    Assessment:     Patient Active Problem List:     Type 2 diabetes mellitus without complication, without long-term current use of insulin ([facility])     Class 3 severe obesity without serious comorbidity with body mass index (BMI) of 40.0 to 44.9 in adult [facility])     Cellulitis of toe of right foot     Uncontrolled type 2 diabetes mellitus with hyperglycemia ([facility] [address]      Plan:     1. Reviewed POC blood glucose . Labs and X ray results   2. Reviewed Current Medicines   3. On meal/ Correction bolus Humalog/ Basal Lantus Insulin regime   4. Monitor Blood glucose frequently   5. Modified  the dose of Insulin/ other medicines as needed  6. Amputation of right second toe on 6/29/2022  7. Will follow     .      Georgina Leal MD, MD

## 2022-06-30 LAB
CREAT SERPL-MCNC: 0.7 MG/DL (ref 0.9–1.3)
GFR AFRICAN AMERICAN: >60 ML/MIN/1.73M2
GFR NON-AFRICAN AMERICAN: >60 ML/MIN/1.73M2
GLUCOSE BLD-MCNC: 170 MG/DL (ref 70–99)
GLUCOSE BLD-MCNC: 196 MG/DL (ref 70–99)
GLUCOSE BLD-MCNC: 219 MG/DL (ref 70–99)
GLUCOSE BLD-MCNC: 236 MG/DL (ref 70–99)
GLUCOSE BLD-MCNC: 242 MG/DL (ref 70–99)

## 2022-06-30 PROCEDURE — 2580000003 HC RX 258: Performed by: PHYSICIAN ASSISTANT

## 2022-06-30 PROCEDURE — 99024 POSTOP FOLLOW-UP VISIT: CPT | Performed by: PHYSICIAN ASSISTANT

## 2022-06-30 PROCEDURE — 82962 GLUCOSE BLOOD TEST: CPT

## 2022-06-30 PROCEDURE — 6360000002 HC RX W HCPCS: Performed by: PHYSICIAN ASSISTANT

## 2022-06-30 PROCEDURE — 6370000000 HC RX 637 (ALT 250 FOR IP): Performed by: PHYSICIAN ASSISTANT

## 2022-06-30 PROCEDURE — 6370000000 HC RX 637 (ALT 250 FOR IP): Performed by: HOSPITALIST

## 2022-06-30 PROCEDURE — 94761 N-INVAS EAR/PLS OXIMETRY MLT: CPT

## 2022-06-30 PROCEDURE — 36415 COLL VENOUS BLD VENIPUNCTURE: CPT

## 2022-06-30 PROCEDURE — 6360000002 HC RX W HCPCS: Performed by: HOSPITALIST

## 2022-06-30 PROCEDURE — 1200000000 HC SEMI PRIVATE

## 2022-06-30 PROCEDURE — 99232 SBSQ HOSP IP/OBS MODERATE 35: CPT | Performed by: NURSE PRACTITIONER

## 2022-06-30 PROCEDURE — 82565 ASSAY OF CREATININE: CPT

## 2022-06-30 PROCEDURE — APPNB45 APP NON BILLABLE 31-45 MINUTES: Performed by: PHYSICIAN ASSISTANT

## 2022-06-30 PROCEDURE — 2500000003 HC RX 250 WO HCPCS: Performed by: PHYSICIAN ASSISTANT

## 2022-06-30 PROCEDURE — 6370000000 HC RX 637 (ALT 250 FOR IP): Performed by: NURSE PRACTITIONER

## 2022-06-30 RX ORDER — CIPROFLOXACIN 500 MG/1
500 TABLET, FILM COATED ORAL EVERY 12 HOURS SCHEDULED
Status: DISCONTINUED | OUTPATIENT
Start: 2022-06-30 | End: 2022-07-01 | Stop reason: HOSPADM

## 2022-06-30 RX ORDER — DOXYCYCLINE HYCLATE 100 MG
100 TABLET ORAL EVERY 12 HOURS SCHEDULED
Status: DISCONTINUED | OUTPATIENT
Start: 2022-06-30 | End: 2022-07-01 | Stop reason: HOSPADM

## 2022-06-30 RX ORDER — INSULIN GLARGINE 100 [IU]/ML
33 INJECTION, SOLUTION SUBCUTANEOUS NIGHTLY
Status: DISCONTINUED | OUTPATIENT
Start: 2022-06-30 | End: 2022-07-01

## 2022-06-30 RX ADMIN — CEFEPIME 2000 MG: 2 INJECTION, POWDER, FOR SOLUTION INTRAVENOUS at 02:01

## 2022-06-30 RX ADMIN — ENOXAPARIN SODIUM 60 MG: 100 INJECTION SUBCUTANEOUS at 09:14

## 2022-06-30 RX ADMIN — INSULIN LISPRO 2 UNITS: 100 INJECTION, SOLUTION INTRAVENOUS; SUBCUTANEOUS at 09:15

## 2022-06-30 RX ADMIN — SODIUM CHLORIDE, PRESERVATIVE FREE 10 ML: 5 INJECTION INTRAVENOUS at 11:15

## 2022-06-30 RX ADMIN — CIPROFLOXACIN HYDROCHLORIDE 500 MG: 500 TABLET, FILM COATED ORAL at 21:27

## 2022-06-30 RX ADMIN — METRONIDAZOLE 500 MG: 500 INJECTION, SOLUTION INTRAVENOUS at 09:30

## 2022-06-30 RX ADMIN — INSULIN LISPRO 1 UNITS: 100 INJECTION, SOLUTION INTRAVENOUS; SUBCUTANEOUS at 18:23

## 2022-06-30 RX ADMIN — ENOXAPARIN SODIUM 60 MG: 100 INJECTION SUBCUTANEOUS at 21:27

## 2022-06-30 RX ADMIN — INSULIN GLARGINE 33 UNITS: 100 INJECTION, SOLUTION SUBCUTANEOUS at 21:27

## 2022-06-30 RX ADMIN — DOXYCYCLINE HYCLATE 100 MG: 100 TABLET, COATED ORAL at 21:27

## 2022-06-30 RX ADMIN — METRONIDAZOLE 500 MG: 500 INJECTION, SOLUTION INTRAVENOUS at 02:02

## 2022-06-30 RX ADMIN — INSULIN LISPRO 1 UNITS: 100 INJECTION, SOLUTION INTRAVENOUS; SUBCUTANEOUS at 02:02

## 2022-06-30 RX ADMIN — INSULIN LISPRO 1 UNITS: 100 INJECTION, SOLUTION INTRAVENOUS; SUBCUTANEOUS at 21:28

## 2022-06-30 RX ADMIN — DOXYCYCLINE HYCLATE 100 MG: 100 TABLET, COATED ORAL at 11:39

## 2022-06-30 RX ADMIN — CEFEPIME 2000 MG: 2 INJECTION, POWDER, FOR SOLUTION INTRAVENOUS at 09:25

## 2022-06-30 RX ADMIN — CIPROFLOXACIN HYDROCHLORIDE 500 MG: 500 TABLET, FILM COATED ORAL at 11:39

## 2022-06-30 RX ADMIN — SODIUM CHLORIDE, PRESERVATIVE FREE 10 ML: 5 INJECTION INTRAVENOUS at 22:48

## 2022-06-30 RX ADMIN — INSULIN LISPRO 2 UNITS: 100 INJECTION, SOLUTION INTRAVENOUS; SUBCUTANEOUS at 13:16

## 2022-06-30 RX ADMIN — VANCOMYCIN HYDROCHLORIDE 1500 MG: 1 INJECTION, POWDER, LYOPHILIZED, FOR SOLUTION INTRAVENOUS at 06:22

## 2022-06-30 ASSESSMENT — ENCOUNTER SYMPTOMS
CONSTIPATION: 0
EYE REDNESS: 0
CHOKING: 0
COLOR CHANGE: 0
RECTAL PAIN: 0
APNEA: 0
EYE ITCHING: 0
PHOTOPHOBIA: 0
ANAL BLEEDING: 0
STRIDOR: 0
BACK PAIN: 0
SORE THROAT: 0

## 2022-06-30 NOTE — PROGRESS NOTES
Infectious Disease Progress Note  2022   Patient Name: Syeda Olson : 1985   Impression  · Right 2nd Toe Gangrene with OM and Septic Arthritis:     § Afebrile, no leukocytosis  § -BC 0/2-NGTD  § Pct 0.048, 0.040  § CRP 2.8, 2.1  § -XR Foot Right:Suspected septic arthritis/osteomyelitis of the PIP joint of the 2nd toe with   lateral dislocation. § -MRI Foot Right WO Contrast:  Findings consistent with acute septic arthritis and osteomyelitis at the 2nd   PIP joint with osteomyelitis involving nearly the entire middle and proximal   phalanges.  A soft tissue defect communicates with the PIP joint space. § -S/p per Dr. Ingrid Meyers: Toe amputation right 2nd toe. DX: left second toe gangrene. Surgical pathology: Pending      · DMII: Uncontrolled:  § Dr. Corina Lu onboard  § -HbAIC: 9.3  § Newly diagnosed about      ? Morbid Obesity:  BMI 43/94     · Multi-morbidity: per PMHx:     Plan:  · DC IV cefepime, Flagyl, and vancomycin  · Start po doxycycline 100 mg bid x 14 days (end date 22)  · Start po Cipro 500 mg bid x 14 days (end date 22)  ? Follow up with general surgery after DC  ? OK from ID standpoint to DC when ready    Ongoing Antimicrobial Therapy  Doxycycline -  Cipro -? Completed Antimicrobial Therapy  Cefepime   Vancomycin   Metronidazole   ? History:? Interval history noted. Chief complaint: right 2nd toe gangrene with OM and septic arthritis. Denies n/v/d/f or untoward effects of antibiotics. States is feeling well today, denies any pain right foot. Physical Exam:  Vital Signs: /73   Pulse 80   Temp 97.7 °F (36.5 °C) (Oral)   Resp 16   Ht 6' 8\" (2.032 m)   Wt (!) 400 lb (181.4 kg)   SpO2 96%   BMI 43.94 kg/m²     Gen: A:&O x 4, pleasant, calm  Wounds: C/D/I dressing right foot, no surrounding erythema, edema or warmth noted  Chest: no distress and CTA. Good air movement. Room air. Heart: RRR and no MRG.    Abd: soft, non-distended, no tenderness, no hepatomegaly. Normoactive bowel sounds. Ext: no clubbing, cyanosis, or edema. See wounds above. Neuro: Mental status intact. CN 2-12 intact and no focal sensory or motor deficits     Radiologic / Imaging / TESTING  6/27/22 XR Foot Right:  Impression   Suspected septic arthritis/osteomyelitis of the PIP joint of the 2nd toe with   lateral dislocation.  Consider MRI.      6/28/22 MRI Foot Right WO Contrast:  Impression   Findings consistent with acute septic arthritis and osteomyelitis at the 2nd   PIP joint with osteomyelitis involving nearly the entire middle and proximal   phalanges.  A soft tissue defect communicates with the PIP joint space.           Labs:    Recent Results (from the past 24 hour(s))   POCT Glucose    Collection Time: 06/29/22 12:33 PM   Result Value Ref Range    POC Glucose 244 (H) 70 - 99 MG/DL   POCT Glucose    Collection Time: 06/29/22  5:40 PM   Result Value Ref Range    POC Glucose 187 (H) 70 - 99 MG/DL   POCT Glucose    Collection Time: 06/29/22  7:45 PM   Result Value Ref Range    POC Glucose 183 (H) 70 - 99 MG/DL   POCT Glucose    Collection Time: 06/30/22  1:58 AM   Result Value Ref Range    POC Glucose 196 (H) 70 - 99 MG/DL   POCT Glucose    Collection Time: 06/30/22  8:15 AM   Result Value Ref Range    POC Glucose 242 (H) 70 - 99 MG/DL     CULTURE results: Invalid input(s): BLOOD CULTURE,  URINE CULTURE, SURGICAL CULTURE    Diagnosis:  Patient Active Problem List   Diagnosis    Type 2 diabetes mellitus without complication, without long-term current use of insulin (HCC)    Class 3 severe obesity without serious comorbidity with body mass index (BMI) of 40.0 to 44.9 in [facility])    Cellulitis of toe of right foot    Uncontrolled type 2 diabetes mellitus with hyperglycemia (HCC)    Osteomyelitis of toe of right foot (HCC)       Active Problems  Principal Problem:    Cellulitis of toe of right foot  Active Problems:    Osteomyelitis of toe of right foot ([address]  Resolved Problems:    * No resolved hospital problems. *    Electronically signed by: Electronically signed by Sue Jacobson.  SHAUNNA Clayton CNP on 6/30/2022 at 10:24 AM

## 2022-06-30 NOTE — PROGRESS NOTES
[facility] HOSPITALIST PROGRESS NOTE      PCP: Chas Castillo MD    Date of Admission: 6/27/2022    Subjective:  Pain well controlled     Brief Hospital summary patient is a 38-year-old male with history of diabetes mellitus who was admitted with right foot cellulitis. X-ray foot showed arthritis/osteomyelitis of second toe with lateral dislocation, vancomycin and cefepime started, surgery consulted  MRI was consistent with osteomyelitis,    Vitals signs:  Afebrile, HR 80s range, on room air, blood pressure 123/73     Medications: Cefepime, Lovenox, Lantus, sliding scale insulin, Flagyl, vancomycin    Antibiotics: Cefepime, Flagyl and vancomycin day 4    Fluid status: Not documented    Labs: Urine output not documented    Imaging: MRI shows finding consistent with acute septic arthritis and osteomyelitis of second PIP joint with osteomyelitis involving the ring there entirely middle and proximal phalanges    Assessment/Plan:     Second toe foot septic arthritis/osteomyelitis:   MRI shows septic arthritis and osteomyelitis of second toe  Surgery consulted  I&D performed 6/29  ID consulted  Recommend mended continuing Vanco, Flagyl, cefepime and await cultures    Morbid obesity: BMI 71.3, complicates all care    Diabetes mellitus type 2 with peripheral neuropathy:  Lantus, sliding scale insulin, last hemoglobin A1c 9.3  Glucose 183-244 range  Increase Lantus to 33 units    DVT prophlaxis:   On Lovenox    Physical Exam Performed:       /73   Pulse 80   Temp 97.7 °F (36.5 °C) (Oral)   Resp 16   Ht 6' 8\" (2.032 m)   Wt (!) 400 lb (181.4 kg)   SpO2 96%   BMI 43.94 kg/m²     Physical Exam  Constitutional:       General: He is not in acute distress. Appearance: Normal appearance. HENT:      Head: Normocephalic and atraumatic. Right Ear: External ear normal.      Left Ear: External ear normal.   Eyes:      Extraocular Movements: Extraocular movements intact.       Pupils: Pupils are RIGHT (MIN 3 VIEWS)   Final Result   Suspected septic arthritis/osteomyelitis of the PIP joint of the 2nd toe with   lateral dislocation. Consider MRI.                  Bhavik Medina MD  6/30/2022 7:24 AM

## 2022-06-30 NOTE — PROGRESS NOTES
GENERAL SURGERY PROGRESS NOTE    Dallas Garcia is a 42 y.o. male with 1 Day Post-Op R 2nd toe amputation . Subjective:    Pain: 0/10   Diet: ADULT DIET; Regular; 4 carb choices (60 gm/meal)  Activity: Tolerating    Pt with no c/o today. Denies pain, fevers or chills. Review of Systems   Constitutional: Negative for chills and fever. HENT: Negative for ear pain, mouth sores, sore throat and tinnitus. Eyes: Negative for photophobia, redness and itching. Respiratory: Negative for apnea, choking and stridor. Cardiovascular: Negative for chest pain and palpitations. Gastrointestinal: Negative for anal bleeding, constipation and rectal pain. Endocrine: Negative for polydipsia. Genitourinary: Negative for enuresis, flank pain and hematuria. Musculoskeletal: Positive for gait problem. Negative for back pain, joint swelling and myalgias. Skin: Negative for color change and pallor. Allergic/Immunologic: Negative for environmental allergies. Neurological: Negative for syncope and speech difficulty. Psychiatric/Behavioral: Negative for confusion and hallucinations. Objective:    Vitals: VITALS:  /73   Pulse 80   Temp 97.7 °F (36.5 °C) (Oral)   Resp 16   Ht 6' 8\" (2.032 m)   Wt (!) 400 lb (181.4 kg)   SpO2 96%   BMI 43.94 kg/m²   TEMPERATURE:  Current - Temp: 97.7 °F (36.5 °C);  Max - Temp  Av °F (36.7 °C)  Min: 97.7 °F (36.5 °C)  Max: 98.3 °F (36.8 °C)    I/O:  0701 -  0700  In: 480 [P.O.:480]  Out: -     Labs/Imaging Results:   Lab Results   Component Value Date    WBC 5.1 2022    HGB 12.7 (L) 2022    HCT 40.5 (L) 2022    MCV 90.2 2022     2022     Lab Results   Component Value Date     2022    K 4.3 2022     2022    CO2 29 2022    BUN 8 2022    CREATININE 0.8 (L) 2022    GLUCOSE 207 (H) 2022    CALCIUM 9.0 2022    PROT 6.5 2022    LABALBU 4.1 06/28/2022    BILITOT 0.9 06/28/2022    ALKPHOS 55 06/28/2022    AST 37 06/28/2022    ALT 55 (H) 06/28/2022    LABGLOM >60 06/29/2022    GFRAA >60 06/29/2022    AGRATIO 1.7 01/24/2022       Scheduled Meds:   insulin glargine, 33 Units, SubCUTAneous, Nightly    enoxaparin, 60 mg, SubCUTAneous, BID    vancomycin, 1,500 mg, IntraVENous, Q8H    metroNIDAZOLE, 500 mg, IntraVENous, Q8H    cefepime, 2,000 mg, IntraVENous, Q8H    sodium chloride flush, 5-40 mL, IntraVENous, 2 times per day    insulin lispro, 0-6 Units, SubCUTAneous, TID WC    insulin lispro, 0-3 Units, SubCUTAneous, 2 times per day    Physical Exam:  Physical Exam  Constitutional:       Appearance: He is well-developed. HENT:      Head: Normocephalic. Eyes:      Pupils: Pupils are equal, round, and reactive to light. Cardiovascular:      Rate and Rhythm: Normal rate. Pulmonary:      Effort: Pulmonary effort is normal.   Abdominal:      General: There is no distension. Palpations: Abdomen is soft. There is no mass. Tenderness: There is no abdominal tenderness. There is no guarding or rebound. Musculoskeletal:         General: Normal range of motion. Cervical back: Normal range of motion and neck supple. Feet:      Comments: R foot dressing clean, dry and intact. Skin:     General: Skin is warm. Neurological:      Mental Status: He is alert and oriented to person, place, and time. Assessment and Plan:    Patient Active Problem List:     Type 2 diabetes mellitus without complication, without long-term current use of insulin ([facility])     Class 3 severe obesity without serious comorbidity with body mass index (BMI) of 40.0 to 44.9 in adult [facility])     Cellulitis of toe of right foot     Uncontrolled type 2 diabetes mellitus with hyperglycemia ([facility])     Osteomyelitis of toe of right foot ([facility])        Diet: As tolerated  Wound care: Remove old dressings to R Foot. Wash with soap and water.  Once dry, apply xeroform over incision. Cover with gauze and kerlix wrap. Change daily and Prn. Activity: Wear post-op shoe when OOB. HTWB to R foot  Abx: Per medicine and ID  Med changes: None   Labs/Imaging: Reviewed  Disposition: OK for d/c from surgical standpoint. Will f/u in our office in 2 weeks.        Burak Nguyen PA-C

## 2022-07-01 VITALS
WEIGHT: 315 LBS | OXYGEN SATURATION: 99 % | RESPIRATION RATE: 18 BRPM | HEART RATE: 87 BPM | HEIGHT: 78 IN | SYSTOLIC BLOOD PRESSURE: 127 MMHG | BODY MASS INDEX: 36.45 KG/M2 | TEMPERATURE: 97.9 F | DIASTOLIC BLOOD PRESSURE: 81 MMHG

## 2022-07-01 LAB
GLUCOSE BLD-MCNC: 191 MG/DL (ref 70–99)
GLUCOSE BLD-MCNC: 208 MG/DL (ref 70–99)
GLUCOSE BLD-MCNC: 214 MG/DL (ref 70–99)

## 2022-07-01 PROCEDURE — 6370000000 HC RX 637 (ALT 250 FOR IP): Performed by: PHYSICIAN ASSISTANT

## 2022-07-01 PROCEDURE — 6370000000 HC RX 637 (ALT 250 FOR IP): Performed by: NURSE PRACTITIONER

## 2022-07-01 PROCEDURE — 94761 N-INVAS EAR/PLS OXIMETRY MLT: CPT

## 2022-07-01 PROCEDURE — 6360000002 HC RX W HCPCS: Performed by: HOSPITALIST

## 2022-07-01 PROCEDURE — 82565 ASSAY OF CREATININE: CPT

## 2022-07-01 PROCEDURE — 2580000003 HC RX 258: Performed by: PHYSICIAN ASSISTANT

## 2022-07-01 PROCEDURE — 6370000000 HC RX 637 (ALT 250 FOR IP): Performed by: INTERNAL MEDICINE

## 2022-07-01 PROCEDURE — 82962 GLUCOSE BLOOD TEST: CPT

## 2022-07-01 RX ORDER — INSULIN GLARGINE 100 [IU]/ML
35 INJECTION, SOLUTION SUBCUTANEOUS NIGHTLY
Qty: 10 ML | Refills: 3 | Status: CANCELLED | OUTPATIENT
Start: 2022-07-01

## 2022-07-01 RX ORDER — INSULIN GLARGINE 100 [IU]/ML
35 INJECTION, SOLUTION SUBCUTANEOUS NIGHTLY
Qty: 5 PEN | Refills: 3 | Status: SHIPPED | OUTPATIENT
Start: 2022-07-01 | End: 2022-07-26 | Stop reason: SDUPTHER

## 2022-07-01 RX ORDER — DOXYCYCLINE HYCLATE 100 MG
100 TABLET ORAL EVERY 12 HOURS SCHEDULED
Qty: 25 TABLET | Refills: 0 | Status: SHIPPED | OUTPATIENT
Start: 2022-07-01 | End: 2022-07-14

## 2022-07-01 RX ORDER — HYDROCODONE BITARTRATE AND ACETAMINOPHEN 5; 325 MG/1; MG/1
1 TABLET ORAL EVERY 6 HOURS PRN
Qty: 10 TABLET | Refills: 0 | Status: SHIPPED | OUTPATIENT
Start: 2022-07-01 | End: 2022-07-04

## 2022-07-01 RX ORDER — CIPROFLOXACIN 500 MG/1
500 TABLET, FILM COATED ORAL EVERY 12 HOURS SCHEDULED
Qty: 25 TABLET | Refills: 0 | Status: SHIPPED | OUTPATIENT
Start: 2022-07-01 | End: 2022-07-14

## 2022-07-01 RX ORDER — INSULIN LISPRO 100 [IU]/ML
INJECTION, SOLUTION INTRAVENOUS; SUBCUTANEOUS
Qty: 1 PEN | Refills: 0 | Status: SHIPPED | OUTPATIENT
Start: 2022-07-01 | End: 2022-07-26 | Stop reason: SDUPTHER

## 2022-07-01 RX ORDER — INSULIN LISPRO 100 [IU]/ML
0-6 INJECTION, SOLUTION INTRAVENOUS; SUBCUTANEOUS
Status: DISCONTINUED | OUTPATIENT
Start: 2022-07-01 | End: 2022-07-01 | Stop reason: HOSPADM

## 2022-07-01 RX ORDER — INSULIN GLARGINE 100 [IU]/ML
35 INJECTION, SOLUTION SUBCUTANEOUS NIGHTLY
Status: DISCONTINUED | OUTPATIENT
Start: 2022-07-01 | End: 2022-07-01 | Stop reason: HOSPADM

## 2022-07-01 RX ADMIN — ENOXAPARIN SODIUM 60 MG: 100 INJECTION SUBCUTANEOUS at 09:28

## 2022-07-01 RX ADMIN — INSULIN LISPRO 2 UNITS: 100 INJECTION, SOLUTION INTRAVENOUS; SUBCUTANEOUS at 09:30

## 2022-07-01 RX ADMIN — METFORMIN HYDROCHLORIDE 850 MG: 850 TABLET ORAL at 10:17

## 2022-07-01 RX ADMIN — SODIUM CHLORIDE, PRESERVATIVE FREE 10 ML: 5 INJECTION INTRAVENOUS at 10:17

## 2022-07-01 RX ADMIN — INSULIN LISPRO 1 UNITS: 100 INJECTION, SOLUTION INTRAVENOUS; SUBCUTANEOUS at 02:32

## 2022-07-01 RX ADMIN — CIPROFLOXACIN HYDROCHLORIDE 500 MG: 500 TABLET, FILM COATED ORAL at 09:28

## 2022-07-01 RX ADMIN — DOXYCYCLINE HYCLATE 100 MG: 100 TABLET, COATED ORAL at 09:28

## 2022-07-01 NOTE — PROGRESS NOTES
Outpatient Pharmacy Progress Note for Meds-to-Beds    Total number of Prescriptions Filled: 10  The following medications were dispensed to the patient during the discharge process:   ciprofloxacin (CIPRO)    doxycycline hyclate (VIBRA-TABS)    HYDROcodone-acetaminophen (Norco)    insulin lispro (1 Unit Dial) (HumaLOG KwikPen)    Lantus SoloStar    ALCOHOL SWABS   PEN NEEDLES   LANCETS   GLUCOMETER   TEST STRIPS          Additional Documentation:   Medication(s) were delivered to the patient's room prior to discharge      Thank you for letting us serve your patients.   [address] [facility]    [address], [address] [facility]    Phone: [phone]    Fax: [phone]

## 2022-07-01 NOTE — PLAN OF CARE
Problem: Discharge Planning  Goal: Discharge to home or other facility with appropriate resources  Outcome: Progressing     Problem: Chronic Conditions and Co-morbidities  Goal: Patient's chronic conditions and co-morbidity symptoms are monitored and maintained or improved  Outcome: Progressing     Problem: ABCDS Injury Assessment  Goal: Absence of physical injury  Outcome: Progressing  Flowsheets (Taken 6/30/2022 1251 by Analilia Graham LPN)  Absence of Physical Injury: Implement safety measures based on patient assessment

## 2022-07-01 NOTE — PROGRESS NOTES
Per patient Nurse patient wanted paper script sent to [facility]. [place] prescription was sent to [facility] to be filled with his other prescription.

## 2022-07-01 NOTE — DISCHARGE SUMMARY
[facility]    Patient Demographics    Patient. Priyanka Barnes  Date of Birth. 1985  MRN. [id number]     Primary care provider. Cala Galeazzi, MD  (Tel: [phone])    Admit date: 6/27/2022    Discharge date (blank if same as Note Date): Note Date: 7/1/2022     Reason for Hospitalization. Chief Complaint   Patient presents with    Abscess     right 2nd toe         Diagnosis. Principal Problem:    Cellulitis of toe of right foot  Active Problems:    Osteomyelitis of toe of right foot ([address]  Resolved Problems:    * No resolved hospital problems. [facility] Course:    patient is a 49-year-old male with history of diabetes mellitus who was admitted with right foot cellulitis. X-ray foot showed arthritis/osteomyelitis of second toe with lateral dislocation, vancomycin and cefepime started, surgery consulted  MRI was consistent with osteomyelitis,  I&D performed  Was switched to p.o. doxycycline and ciprofloxacin for 14 days, end date 7/14/2022  Discharge home, stable at time of discharge  Patient was discharged on Lantus, diabetic supplies provided, will follow up with endocrinology as an outpatient    Invasive procedures and treatments. 1. Incision and drainage    Consults. IP CONSULT TO HOSPITALIST  IP CONSULT TO GENERAL SURGERY  IP CONSULT TO ENDOCRINOLOGY  IP CONSULT TO PHARMACY  IP CONSULT TO INFECTIOUS DISEASES  IP CONSULT TO DIABETES EDUCATOR    Physical examination on discharge day. /87   Pulse 77   Temp 97.9 °F (36.6 °C) (Oral)   Resp 18   Ht 6' 8\" (2.032 m)   Wt (!) 400 lb (181.4 kg)   SpO2 99%   BMI 43.94 kg/m²   General appearance. Alert. Looks comfortable. HEENT. Sclera clear. Moist mucus membranes. Cardiovascular. Regular rate and rhythm, normal S1, S2. No murmur. Respiratory. Not using accessory muscles. Clear to auscultation bilaterally, no wheeze. Gastrointestinal. Abdomen soft, non-tender, not distended, normal bowel sounds  Neurology. Facial symmetry. No speech deficits. Moving all extremities equally. Extremities. No edema in lower extremities. Foot dressing in place   Skin. Warm, dry, normal turgor    Condition at time of discharge stable    Medication instructions provided to patient at discharge. Medication List      CONTINUE taking these medications    blood glucose monitor kit and supplies  Use 3-4 times daily     Lancets Misc  Use one lancet 2  times daily        ASK your doctor about these medications    blood glucose test strips  Test 2-3 times a day & as needed for symptoms of irregular blood glucose. glimepiride 2 MG tablet  Commonly known as: AMARYL  Take 1 tablet by mouth every morning (before breakfast)     metFORMIN 500 MG extended release tablet  Commonly known as: GLUCOPHAGE-XR  Take 2 tablets by mouth 2 times daily            Discharge recommendations given to patient. Follow Up. PCP in 1 week   Disposition. home  Activity. As tolerated  Diet: ADULT DIET; Regular; 4 carb choices (60 gm/meal)      Spent 25 minutes in discharge process.     Signed:  Kade Fairchild MD     7/1/2022 9:13 AM

## 2022-07-01 NOTE — PROGRESS NOTES
This nurse reviewed discharge instructions with patient. Patient has several questions on his care after being discharged to home. This nurse did wound caret to right foot 2nd toe which was removed. Noted slight dark red drainage, noted no swelling or redness. Sutures intact. Wound care done per doctors  orders. Patient tolerated procedure well. Educated patient on how to use glucometer meter and insulin administration. S/S of hypoglycemia and hyperglycemia. IV removed before discharged without complications. Patient given ortho boot to right foot. No other concerns voiced. Patient brought down in w/c to front door, waiting for ride.

## 2022-07-01 NOTE — PROGRESS NOTES
Progress Note( Dr. Susana Cadena)  6/30/2022  Subjective:   Admit Date: 6/27/2022  PCP: Gucci Valdivia MD    Admitted For :Cellulitis of right second toe    Consulted For: Better control of glucose    Interval History: feels better     on antibiotics   Patient amputated of the right second toe 6/29/2020      Denies any chest pains,   Denies SOB . Denies nausea or vomiting. No new bowel or bladder symptoms. Intake/Output Summary (Last 24 hours) at 6/30/2022 2158  Last data filed at 6/30/2022 0034  Gross per 24 hour   Intake 240 ml   Output --   Net 240 ml       DATA    CBC:   Recent Labs     06/28/22 0646   WBC 5.1   HGB 12.7*       CMP:  Recent Labs     06/28/22  0646 06/29/22  0621 06/30/22  1345     --   --    K 4.3  --   --      --   --    CO2 29  --   --    BUN 8  --   --    CREATININE 0.8* 0.8* 0.7*   CALCIUM 9.0  --   --    PROT 6.5  --   --    LABALBU 4.1  --   --    BILITOT 0.9  --   --    ALKPHOS 55  --   --    AST 37  --   --    ALT 55*  --   --      Lipids:   Lab Results   Component Value Date/Time    CHOL 169 01/24/2022 08:21 AM    HDL 40 01/24/2022 08:21 AM    TRIG 191 01/24/2022 08:21 AM     Glucose:  Recent Labs     06/30/22  1137 06/30/22  1707 06/30/22 2009   POCGLU 236* 170* 219*     [id number]:  Lab Results   Component Value Date/Time    LABA1C 9.3 06/28/2022 06:41 AM     High Sensitivity TSH: No results found for: TSHHS  Free T3: No results found for: FT3  Free T4:  Lab Results   Component Value Date/Time    T4FREE 1.3 01/24/2022 08:21 AM       MRI FOOT RIGHT WO CONTRAST   Final Result   Findings consistent with acute septic arthritis and osteomyelitis at the 2nd   PIP joint with osteomyelitis involving nearly the entire middle and proximal   phalanges. A soft tissue defect communicates with the PIP joint space. XR FOOT RIGHT (MIN 3 VIEWS)   Final Result   Suspected septic arthritis/osteomyelitis of the PIP joint of the 2nd toe with   lateral dislocation. Consider MRI. Scheduled Medicines   Medications:    insulin glargine  33 Units SubCUTAneous Nightly    ciprofloxacin  500 mg Oral 2 times per day    doxycycline hyclate  100 mg Oral 2 times per day    enoxaparin  60 mg SubCUTAneous BID    sodium chloride flush  5-40 mL IntraVENous 2 times per day    insulin lispro  0-6 Units SubCUTAneous TID     insulin lispro  0-3 Units SubCUTAneous 2 times per day      Infusions:    sodium chloride      dextrose           Objective:   Vitals: /76   Pulse 68   Temp 98.5 °F (36.9 °C) (Oral)   Resp 20   Ht 6' 8\" (2.032 m)   Wt (!) 400 lb (181.4 kg)   SpO2 100%   BMI 43.94 kg/m²   General appearance: alert and cooperative with exam  Neck: no JVD or bruit  Thyroid : Normal lobes   Lungs: Has Vesicular Breath sounds   Heart:  regular rate and rhythm  Abdomen: soft, non-tender; bowel sounds normal; no masses,  no organomegaly  Musculoskeletal: Normal  Extremities: extremities normal, , no edema  Right 2 nd toe cellulitis ? ?cj had amputation of right second toe on 6/29/2022  Neurologic:  Awake, alert, oriented to name, place and time. Cranial nerves II-XII are grossly intact. Motor is  intact. Sensory is intact. ,  and gait is normal.    Assessment:     Patient Active Problem List:     Type 2 diabetes mellitus without complication, without long-term current use of insulin ([facility])     Class 3 severe obesity without serious comorbidity with body mass index (BMI) of 40.0 to 44.9 in adult [facility])     Cellulitis of toe of right foot     Uncontrolled type 2 diabetes mellitus with hyperglycemia ([facility]      Plan:     1. Reviewed POC blood glucose . Labs and X ray results   2. Reviewed Current Medicines   3. On meal/ Correction bolus Humalog/ Basal Lantus Insulin regime   4. Monitor Blood glucose frequently   5. Modified  the dose of Insulin/ other medicines as needed  6. Amputation of right second toe on 6/29/2022  7. Will follow     .      Jessica Suarez Warren Lamb MD, MD

## 2022-07-02 LAB
CULTURE: NORMAL
CULTURE: NORMAL
Lab: NORMAL
Lab: NORMAL
SPECIMEN: NORMAL
SPECIMEN: NORMAL

## 2022-07-02 NOTE — PROGRESS NOTES
Progress Note( Dr. Alice Gr)  7/2/2022  Subjective:   Admit Date: 6/27/2022  PCP: Solis Alvarado MD    Admitted For :Cellulitis of right second toe    Consulted For: Better control of glucose    Interval History: feels better     on antibiotics   Patient amputated of the right second toe 6/29/2020      Denies any chest pains,   Denies SOB . Denies nausea or vomiting. No new bowel or bladder symptoms. No intake or output data in the 24 hours ending 07/02/22 1705    DATA    CBC:   No results for input(s): WBC, HGB, PLT in the last 72 hours. CMP:  Recent Labs     06/30/22  1345   CREATININE 0.7*     Lipids:   Lab Results   Component Value Date/Time    CHOL 169 01/24/2022 08:21 AM    HDL 40 01/24/2022 08:21 AM    TRIG 191 01/24/2022 08:21 AM     Glucose:  Recent Labs     07/01/22  0217 07/01/22  0838 07/01/22  1129   POCGLU 208* 214* 191*     [id number]:  Lab Results   Component Value Date/Time    LABA1C 9.3 06/28/2022 06:41 AM     High Sensitivity TSH: No results found for: TSHHS  Free T3: No results found for: FT3  Free T4:  Lab Results   Component Value Date/Time    T4FREE 1.3 01/24/2022 08:21 AM       MRI FOOT RIGHT WO CONTRAST   Final Result   Findings consistent with acute septic arthritis and osteomyelitis at the 2nd   PIP joint with osteomyelitis involving nearly the entire middle and proximal   phalanges. A soft tissue defect communicates with the PIP joint space. XR FOOT RIGHT (MIN 3 VIEWS)   Final Result   Suspected septic arthritis/osteomyelitis of the PIP joint of the 2nd toe with   lateral dislocation. Consider MRI.               Scheduled Medicines   Medications:      Infusions:         Objective:   Vitals: /81   Pulse 87   Temp 97.9 °F (36.6 °C) (Oral)   Resp 18   Ht 6' 8\" (2.032 m)   Wt (!) 400 lb (181.4 kg)   SpO2 99%   BMI 43.94 kg/m²   General appearance: alert and cooperative with exam  Neck: no JVD or bruit  Thyroid : Normal lobes   Lungs: Has Vesicular Breath

## 2022-07-05 ENCOUNTER — TELEPHONE (OUTPATIENT)
Dept: FAMILY MEDICINE CLINIC | Age: 37
End: 2022-07-05

## 2022-07-05 NOTE — TELEPHONE ENCOUNTER
[facility] Transitions Initial Follow Up Call    Outreach made within 2 business days of discharge: Yes    Patient: Shiela Smith Patient : 1985   MRN: [id number]  Reason for Admission: There are no discharge diagnoses documented for the most recent discharge.   Discharge Date: 22       Spoke with: left a vm for pt to call to schedule appt      Discharge department/facility: [facility]    TCM Interactive Patient Contact:  Scheduled appointment with PCP within 7-14 days    Follow Up  Future Appointments   Date Time Provider William Perez   2022 12:15 PM MD Troy Rebolledo   8/3/2022  8:00 AM MD Troy Rebolledo MA

## 2022-07-06 NOTE — OP NOTE
Procedure Note:    Patient ID:  Maria Antonia Obregon  [id number]  42 y.o.  1985    Indications: Ischemic left second toe    Pre-operative Diagnosis: Ischemic left second toe    Post-operative Diagnosis: same    Procedure:  Amputation of left second toe    Surgeon: Tony Nava MD     First Assistant: Alysha Castro PA-C  The  Use of a first assistant was necessary for the proper positioning, prepping, and draping of the patient, as well as the safe and expeditious execution of the case and closure of skin and subcutaneous tissues. Findings:  same    Estimated Blood Loss:  Minimal           Total IV Fluids: 500 ml            Complications:  None; patient tolerated the procedure well. Disposition: PACU - hemodynamically stable. Condition: stable    Procedure Details: The patient was seen again in the Holding Room. The risks, benefits, complications, treatment options, and expected outcomes were discussed with the patient. The possibilities of reaction to medication, pulmonary aspiration, bleeding, recurrent infection, the need for additional procedures, and development of a complication requiring transfusion or further operation were discussed with the patient and/or family. There was concurrence with the proposed plan, and informed consent was obtained. The site of surgery was properly noted/marked. The patient was taken to the Operating Room, identified as Maria Antonia Obregon, and the procedure verified. A Time Out was held and the above information confirmed. The patient was brought to the operating room and   placed supine. After the induction of anesthesia, the left foot was prepped   and draped in the usual sterile fashion. Then, 0.25% Marcaine was   infiltrated around the base of the second toe and a fish-mouth type of   incision was made at the base of the toe circumferentially, and the incision   was deepened through subcutaneous tissue using Bovie electrocautery with good   hemostasis.  The bone was isolated and circumferentially dissected using the   periosteal elevator. The bone was cut at the tarsometatarsal joint and the   wound was hemostatic using Bovie electrocautery. The specimen was sent to   Pathology. Using 3-0 Vicryl, the fascial plane was closed covering the bony stump and 4-0 nylon was used to approximate the skin in interrupted fashion. Fluffs and Kerlix   dressings were applied. The patient was subsequently transferred to the   recovery room in stable condition. Instrument and lap counts were correct at the end of the case.        Claudie Councilman, MD

## 2022-07-14 ENCOUNTER — OFFICE VISIT (OUTPATIENT)
Dept: SURGERY | Age: 37
End: 2022-07-14

## 2022-07-14 VITALS
HEIGHT: 78 IN | OXYGEN SATURATION: 98 % | BODY MASS INDEX: 36.45 KG/M2 | WEIGHT: 315 LBS | DIASTOLIC BLOOD PRESSURE: 96 MMHG | SYSTOLIC BLOOD PRESSURE: 144 MMHG | HEART RATE: 106 BPM

## 2022-07-14 DIAGNOSIS — Z09 POSTOPERATIVE EXAMINATION: Primary | ICD-10-CM

## 2022-07-14 PROCEDURE — 99024 POSTOP FOLLOW-UP VISIT: CPT | Performed by: SURGERY

## 2022-07-14 ASSESSMENT — PATIENT HEALTH QUESTIONNAIRE - PHQ9
SUM OF ALL RESPONSES TO PHQ QUESTIONS 1-9: 0
SUM OF ALL RESPONSES TO PHQ QUESTIONS 1-9: 0
SUM OF ALL RESPONSES TO PHQ9 QUESTIONS 1 & 2: 0
2. FEELING DOWN, DEPRESSED OR HOPELESS: 0
SUM OF ALL RESPONSES TO PHQ QUESTIONS 1-9: 0
1. LITTLE INTEREST OR PLEASURE IN DOING THINGS: 0
SUM OF ALL RESPONSES TO PHQ QUESTIONS 1-9: 0

## 2022-07-15 NOTE — PROGRESS NOTES
Chief Complaint   Patient presents with    Post-Op Check     1ST PO Toe AMP. R 2nd Toe 6/29/22          SUBJECTIVE:  Patient here for post op visit. Pain is minimal.  Wounds: minbruising and no discharge. Past Surgical History:   Procedure Laterality Date    TOE AMPUTATION Right 6/29/2022    TOE AMPUTATION RIGHT 2ND TOE performed by Felicia Garrison MD at [address]     No past medical history on file. Family History   Problem Relation Age of Onset    Diabetes Maternal Grandmother      Social History     Socioeconomic History    Marital status: Single     Spouse name: Not on file    Number of children: Not on file    Years of education: Not on file    Highest education level: Not on file   Occupational History    Not on file   Tobacco Use    Smoking status: Never Smoker    Smokeless tobacco: Never Used   Substance and Sexual Activity    Alcohol use: Never    Drug use: Never    Sexual activity: Not on file   Other Topics Concern    Not on file   Social History Narrative    Not on file     Social Determinants of Health     Financial Resource Strain:     Difficulty of Paying Living Expenses: Not on file   Food Insecurity:     Worried About Running Out of Food in the Last Year: Not on file    Sonu of Food in the Last Year: Not on file   Transportation Needs:     Lack of Transportation (Medical): Not on file    Lack of Transportation (Non-Medical):  Not on file   Physical Activity:     Days of Exercise per Week: Not on file    Minutes of Exercise per Session: Not on file   Stress:     Feeling of Stress : Not on file   Social Connections:     Frequency of Communication with Friends and Family: Not on file    Frequency of Social Gatherings with Friends and Family: Not on file    Attends [employer]ism Services: Not on file    Active Member of Clubs or Organizations: Not on file    Attends Club or Organization Meetings: Not on file    Marital Status: Not on file   Intimate Partner Violence:     Fear of Current or Ex-Partner: Not on file    Emotionally Abused: Not on file    Physically Abused: Not on file    Sexually Abused: Not on file   Housing Stability:     Unable to Pay for Housing in the Last Year: Not on file    Number of [place] in the Last Year: Not on file    Unstable Housing in the Last Year: Not on file       OBJECTIVE:   Physical Exam    Wound well healed without signs of active infection. Suture line intact. Abdomen soft, nontender, nondistended. ASSESSMENT:  Patient doing well on this post operative check. Wounds well healed. 1. Postoperative examination        PLAN:  Plan to remove sutures next week. We will put him referral to Dr. Edward Holcomb podiatry. Continue same  Increase activity as tolerated        No orders of the defined types were placed in this encounter. No orders of the defined types were placed in this encounter. Follow Up: No follow-ups on file.     Ree Hernandez MD

## 2022-07-26 ENCOUNTER — OFFICE VISIT (OUTPATIENT)
Dept: FAMILY MEDICINE CLINIC | Age: 37
End: 2022-07-26
Payer: COMMERCIAL

## 2022-07-26 VITALS
HEIGHT: 78 IN | DIASTOLIC BLOOD PRESSURE: 70 MMHG | BODY MASS INDEX: 36.45 KG/M2 | SYSTOLIC BLOOD PRESSURE: 122 MMHG | OXYGEN SATURATION: 97 % | WEIGHT: 315 LBS | HEART RATE: 95 BPM

## 2022-07-26 DIAGNOSIS — E11.628 DIABETIC INFECTION OF RIGHT FOOT (HCC): ICD-10-CM

## 2022-07-26 DIAGNOSIS — L08.9 DIABETIC INFECTION OF RIGHT FOOT (HCC): ICD-10-CM

## 2022-07-26 DIAGNOSIS — M86.9 OSTEOMYELITIS OF TOE OF RIGHT FOOT (HCC): ICD-10-CM

## 2022-07-26 DIAGNOSIS — B37.2 INTERTRIGINOUS CANDIDIASIS: ICD-10-CM

## 2022-07-26 DIAGNOSIS — E11.65 UNCONTROLLED TYPE 2 DIABETES MELLITUS WITH HYPERGLYCEMIA (HCC): Primary | ICD-10-CM

## 2022-07-26 LAB — HBA1C MFR BLD: 6.6 %

## 2022-07-26 PROCEDURE — 83036 HEMOGLOBIN GLYCOSYLATED A1C: CPT | Performed by: FAMILY MEDICINE

## 2022-07-26 PROCEDURE — 99214 OFFICE O/P EST MOD 30 MIN: CPT | Performed by: FAMILY MEDICINE

## 2022-07-26 PROCEDURE — 3044F HG A1C LEVEL LT 7.0%: CPT | Performed by: FAMILY MEDICINE

## 2022-07-26 RX ORDER — INSULIN GLARGINE 100 [IU]/ML
35 INJECTION, SOLUTION SUBCUTANEOUS NIGHTLY
Qty: 5 PEN | Refills: 3 | Status: SHIPPED | OUTPATIENT
Start: 2022-07-26 | End: 2022-10-26 | Stop reason: SDUPTHER

## 2022-07-26 RX ORDER — INSULIN LISPRO 100 [IU]/ML
INJECTION, SOLUTION INTRAVENOUS; SUBCUTANEOUS
Qty: 1 PEN | Refills: 0 | Status: SHIPPED | OUTPATIENT
Start: 2022-07-26 | End: 2022-08-24

## 2022-07-26 RX ORDER — NYSTATIN 100000 [USP'U]/G
POWDER TOPICAL
Qty: 1 EACH | Refills: 0 | Status: SHIPPED | OUTPATIENT
Start: 2022-07-26 | End: 2022-10-26

## 2022-07-26 SDOH — ECONOMIC STABILITY: FOOD INSECURITY: WITHIN THE PAST 12 MONTHS, YOU WORRIED THAT YOUR FOOD WOULD RUN OUT BEFORE YOU GOT MONEY TO BUY MORE.: PATIENT DECLINED

## 2022-07-26 SDOH — ECONOMIC STABILITY: FOOD INSECURITY: WITHIN THE PAST 12 MONTHS, THE FOOD YOU BOUGHT JUST DIDN'T LAST AND YOU DIDN'T HAVE MONEY TO GET MORE.: PATIENT DECLINED

## 2022-07-26 ASSESSMENT — SOCIAL DETERMINANTS OF HEALTH (SDOH): HOW HARD IS IT FOR YOU TO PAY FOR THE VERY BASICS LIKE FOOD, HOUSING, MEDICAL CARE, AND HEATING?: PATIENT DECLINED

## 2022-07-26 NOTE — PROGRESS NOTES
Meryl Nina  1985  07/31/22    Chief Complaint   Patient presents with    Diabetes     S/p R second toe amputated    Follow-up           Patient here for a 1 month follow-up regarding his diabetes, patient had R second toe apuated. Patient seen by Dr Denae Schafer and was started on insulin, lantus 35 units and humolog Sliding scale, patient stats his blood sugar much better. And the wound healed, and no more drainage. No past medical history on file.   Past Surgical History:   Procedure Laterality Date    TOE AMPUTATION Right 6/29/2022    TOE AMPUTATION RIGHT 2ND TOE performed by Arabella Obregon MD at [facility] OR     Family History   Problem Relation Age of Onset    Diabetes Maternal Grandmother      Social History     Socioeconomic History    Marital status: Single     Spouse name: Not on file    Number of children: Not on file    Years of education: Not on file    Highest education level: Not on file   Occupational History    Not on file   Tobacco Use    Smoking status: Never    Smokeless tobacco: Never   Substance and Sexual Activity    Alcohol use: Never    Drug use: Never    Sexual activity: Not on file   Other Topics Concern    Not on file   Social History Narrative    Not on file     Social Determinants of Health     Financial Resource Strain: Unknown    Difficulty of Paying Living Expenses: Patient refused   Food Insecurity: Unknown    Worried About Running Out of Food in the Last Year: Patient refused    Ran Out of Food in the Last Year: Patient refused   Transportation Needs: Not on file   Physical Activity: Not on file   Stress: Not on file   Social Connections: Not on file   Intimate Partner Violence: Not on file   Housing Stability: Not on file       Allergies   Allergen Reactions    Bactrim [Sulfamethoxazole-Trimethoprim] Rash     Current Outpatient Medications   Medication Sig Dispense Refill    insulin glargine (LANTUS SOLOSTAR) 100 UNIT/ML injection pen Inject 35 Units into the skin nightly 5 pen 3 insulin lispro, 1 Unit Dial, (HUMALOG KWIKPEN) 100 UNIT/ML SOPN *Low Dose Correction Algorithm** Glucose: Dose:  No Insulin 140-199 2 Unit 200-249 3 Units 250-299 4 Units 300-349 5 Units 350-399 6 Units 400 and above 8 Units 1 pen 0    nystatin (MYCOSTATIN) 034845 UNIT/GM powder Apply topically 4 times daily. For 2 wks 1 each 0    metFORMIN (GLUCOPHAGE-XR) 500 MG extended release tablet Take 2 tablets by mouth 2 times daily 120 tablet 5    blood glucose monitor kit and supplies Use 3-4 times daily 1 kit 0    blood glucose monitor strips Test 2-3 times a day & as needed for symptoms of irregular blood glucose. 100 strip 5    Lancets MISC Use one lancet 2  times daily 50 each 5     No current facility-administered medications for this visit. Review of Systems   Constitutional:  Negative for activity change, appetite change, chills, fatigue and fever. HENT:  Negative for congestion. Respiratory:  Negative for cough and shortness of breath. Cardiovascular:  Negative for chest pain and leg swelling. Genitourinary:  Negative for dysuria. Musculoskeletal:  Negative for back pain. Skin:  Positive for wound (R second toe). Neurological:  Negative for dizziness and headaches. Psychiatric/Behavioral:  Negative for agitation and sleep disturbance. The patient is not nervous/anxious.       Lab Results   Component Value Date    WBC 5.1 06/28/2022    HGB 12.7 (L) 06/28/2022    HCT 40.5 (L) 06/28/2022    MCV 90.2 06/28/2022     06/28/2022     Lab Results   Component Value Date     06/28/2022    K 4.3 06/28/2022     06/28/2022    CO2 29 06/28/2022    BUN 8 06/28/2022    CREATININE 0.7 (L) 06/30/2022    GLUCOSE 207 (H) 06/28/2022    CALCIUM 9.0 06/28/2022    PROT 6.5 06/28/2022    LABALBU 4.1 06/28/2022    BILITOT 0.9 06/28/2022    ALKPHOS 55 06/28/2022    AST 37 06/28/2022    ALT 55 (H) 06/28/2022    LABGLOM >60 06/30/2022    GFRAA >60 06/30/2022    AGRATIO 1.7 01/24/2022     Lab Results   Component Value Date    CHOL 169 01/24/2022     Lab Results   Component Value Date    TRIG 191 (H) 01/24/2022     Lab Results   Component Value Date    HDL 40 01/24/2022     Lab Results   Component Value Date    LDLCALC 91 01/24/2022     Lab Results   Component Value Date    LABA1C 6.6 07/26/2022     Lab Results   Component Value Date    TSH 1.73 01/24/2022         /70   Pulse 95   Ht 6' 8\" (2.032 m)   Wt (!) 406 lb (184.2 kg)   SpO2 97%   BMI 44.60 kg/m²     BP Readings from Last 3 Encounters:   07/26/22 122/70   07/21/22 118/72   07/14/22 (!) 144/96       Wt Readings from Last 3 Encounters:   07/26/22 (!) 406 lb (184.2 kg)   07/14/22 (!) 399 lb 9.6 oz (181.3 kg)   06/28/22 (!) 400 lb (181.4 kg)         Physical Exam  Constitutional:       General: He is not in acute distress. Appearance: Normal appearance. He is well-developed. He is obese. He is not diaphoretic. HENT:      Head: Normocephalic and atraumatic. Eyes:      General: No scleral icterus. Cardiovascular:      Rate and Rhythm: Normal rate and regular rhythm. Heart sounds: Normal heart sounds. No murmur heard. Pulmonary:      Effort: Pulmonary effort is normal.      Breath sounds: Normal breath sounds. No wheezing. Musculoskeletal:         General: Normal range of motion. Cervical back: Normal range of motion and neck supple. No rigidity. Right lower leg: No edema. Left lower leg: No edema. Skin:     Comments: R second toe ampuated and the wound healed, but has fungal infection between the toes   Neurological:      General: No focal deficit present. Mental Status: He is alert and oriented to person, place, and time. Cranial Nerves: No cranial nerve deficit. Psychiatric:         Mood and Affect: Mood normal.         Behavior: Behavior normal.       ASSESSMENT/ PLAN:    1.  Uncontrolled type 2 diabetes mellitus with hyperglycemia (HCC)  A1C went down, patient feeling better  - insulin glargine

## 2022-07-31 PROBLEM — B37.2 INTERTRIGINOUS CANDIDIASIS: Status: ACTIVE | Noted: 2022-07-31

## 2022-07-31 ASSESSMENT — ENCOUNTER SYMPTOMS
BACK PAIN: 0
COUGH: 0
SHORTNESS OF BREATH: 0

## 2022-08-24 DIAGNOSIS — E11.65 UNCONTROLLED TYPE 2 DIABETES MELLITUS WITH HYPERGLYCEMIA (HCC): ICD-10-CM

## 2022-08-24 RX ORDER — INSULIN LISPRO 100 [IU]/ML
INJECTION, SOLUTION INTRAVENOUS; SUBCUTANEOUS
Qty: 15 ML | Refills: 5 | Status: SHIPPED | OUTPATIENT
Start: 2022-08-24 | End: 2022-10-26 | Stop reason: SDUPTHER

## 2022-10-26 ENCOUNTER — OFFICE VISIT (OUTPATIENT)
Dept: FAMILY MEDICINE CLINIC | Age: 37
End: 2022-10-26
Payer: COMMERCIAL

## 2022-10-26 VITALS
BODY MASS INDEX: 45.81 KG/M2 | SYSTOLIC BLOOD PRESSURE: 140 MMHG | OXYGEN SATURATION: 95 % | DIASTOLIC BLOOD PRESSURE: 87 MMHG | WEIGHT: 315 LBS | HEART RATE: 92 BPM

## 2022-10-26 DIAGNOSIS — R03.0 ELEVATED BLOOD PRESSURE READING: ICD-10-CM

## 2022-10-26 DIAGNOSIS — E11.9 TYPE 2 DIABETES MELLITUS WITHOUT COMPLICATION, WITHOUT LONG-TERM CURRENT USE OF INSULIN (HCC): Primary | ICD-10-CM

## 2022-10-26 DIAGNOSIS — E66.01 CLASS 3 SEVERE OBESITY WITHOUT SERIOUS COMORBIDITY WITH BODY MASS INDEX (BMI) OF 45.0 TO 49.9 IN ADULT, UNSPECIFIED OBESITY TYPE (HCC): ICD-10-CM

## 2022-10-26 DIAGNOSIS — L73.9 FOLLICULITIS: ICD-10-CM

## 2022-10-26 DIAGNOSIS — B37.2 INTERTRIGINOUS CANDIDIASIS: ICD-10-CM

## 2022-10-26 PROCEDURE — 99214 OFFICE O/P EST MOD 30 MIN: CPT | Performed by: FAMILY MEDICINE

## 2022-10-26 PROCEDURE — 3044F HG A1C LEVEL LT 7.0%: CPT | Performed by: FAMILY MEDICINE

## 2022-10-26 RX ORDER — METFORMIN HYDROCHLORIDE 500 MG/1
1000 TABLET, EXTENDED RELEASE ORAL 2 TIMES DAILY
Qty: 360 TABLET | Refills: 3 | Status: SHIPPED | OUTPATIENT
Start: 2022-10-26

## 2022-10-26 RX ORDER — DOXYCYCLINE HYCLATE 100 MG
100 TABLET ORAL 2 TIMES DAILY
Qty: 20 TABLET | Refills: 0 | Status: SHIPPED | OUTPATIENT
Start: 2022-10-26 | End: 2022-11-05

## 2022-10-26 RX ORDER — INSULIN GLARGINE 100 [IU]/ML
35 INJECTION, SOLUTION SUBCUTANEOUS NIGHTLY
Qty: 15 ADJUSTABLE DOSE PRE-FILLED PEN SYRINGE | Refills: 5 | Status: SHIPPED | OUTPATIENT
Start: 2022-10-26

## 2022-10-26 RX ORDER — NYSTATIN 100000 U/G
CREAM TOPICAL
Qty: 60 G | Refills: 2 | Status: SHIPPED | OUTPATIENT
Start: 2022-10-26

## 2022-10-26 RX ORDER — INSULIN LISPRO 100 [IU]/ML
INJECTION, SOLUTION INTRAVENOUS; SUBCUTANEOUS
Qty: 15 ADJUSTABLE DOSE PRE-FILLED PEN SYRINGE | Refills: 3 | Status: SHIPPED | OUTPATIENT
Start: 2022-10-26

## 2022-10-26 NOTE — PROGRESS NOTES
Lacho Nolasco  1985  10/27/22    Chief Complaint   Patient presents with    3 Month Follow-Up     Patient here for a 3 months follow-up regarding his diabetes discussed medication and he complained of rash on his head    Discuss Medications     Pt would like a nystatin cream instead of powder            Patient here for a 3 months follow-up regarding his diabetes, he is taking Lantus 35 units at night and he is on Humalog 3 times a day but very rare to take even 1 dose, he is also complaining of rash on the back of his head, has this rash before and the antibiotic was given and took care of that, patient also would like to get the nystatin cream instead of powder. He did not see the foot doctor yet. No past medical history on file.   Past Surgical History:   Procedure Laterality Date    TOE AMPUTATION Right 6/29/2022    TOE AMPUTATION RIGHT 2ND TOE performed by Destiny Morfin MD at [address] [place] [address] OR     Family History   Problem Relation Age of Onset    Diabetes Maternal Grandmother      Social History     Socioeconomic History    Marital status: Single     Spouse name: Not on file    Number of children: Not on file    Years of education: Not on file    Highest education level: Not on file   Occupational History    Not on file   Tobacco Use    Smoking status: Never    Smokeless tobacco: Never   Substance and Sexual Activity    Alcohol use: Never    Drug use: Never    Sexual activity: Not on file   Other Topics Concern    Not on file   Social History Narrative    Not on file     Social Determinants of Health     Financial Resource Strain: Unknown    Difficulty of Paying Living Expenses: Patient refused   Food Insecurity: Unknown    Worried About Running Out of Food in the Last Year: Patient refused    Ran Out of Food in the Last Year: Patient refused   Transportation Needs: Not on file   Physical Activity: Not on file   Stress: Not on file   Social Connections: Not on file   Intimate Partner Violence: Not on file Housing Stability: Not on file       Allergies   Allergen Reactions    Bactrim [Sulfamethoxazole-Trimethoprim] Rash     Current Outpatient Medications   Medication Sig Dispense Refill    nystatin (MYCOSTATIN) 101855 UNIT/GM cream Apply topically 2 times daily. 60 g 2    doxycycline hyclate (VIBRA-TABS) 100 MG tablet Take 1 tablet by mouth 2 times daily for 10 days 20 tablet 0    metFORMIN (GLUCOPHAGE-XR) 500 MG extended release tablet Take 2 tablets by mouth 2 times daily 360 tablet 3    insulin glargine (LANTUS SOLOSTAR) 100 UNIT/ML injection pen Inject 35 Units into the skin nightly 15 Adjustable Dose Pre-filled Pen Syringe 5    HUMALOG KWIKPEN 100 UNIT/ML SOPN DOSE  NO INSULIN 140-199 2 UNITS, 200-249 3 UNITS, 250-299 4 UNITS, 300-349 5 UNITS, 350-399 6 UNITS, 400 AND ABOVE 8 UNITS 15 Adjustable Dose Pre-filled Pen Syringe 3    Lancets MISC Use one lancet 2  times daily 50 each 5    blood glucose monitor kit and supplies Use 3-4 times daily 1 kit 0    blood glucose monitor strips Test 2-3 times a day & as needed for symptoms of irregular blood glucose. 100 strip 5     No current facility-administered medications for this visit. Review of Systems   Constitutional:  Negative for activity change, appetite change, chills, fatigue and fever. HENT:  Negative for congestion. Respiratory:  Negative for cough and shortness of breath. Cardiovascular:  Negative for chest pain and leg swelling. Genitourinary:  Negative for dysuria. Musculoskeletal:  Negative for back pain. Skin:  Positive for rash (On the back of his head). Neurological:  Negative for dizziness and headaches. Psychiatric/Behavioral:  Negative for agitation and sleep disturbance. The patient is not nervous/anxious.       Lab Results   Component Value Date    WBC 5.1 06/28/2022    HGB 12.7 (L) 06/28/2022    HCT 40.5 (L) 06/28/2022    MCV 90.2 06/28/2022     06/28/2022     Lab Results   Component Value Date     06/28/2022    K 4.3 06/28/2022     06/28/2022    CO2 29 06/28/2022    BUN 8 06/28/2022    CREATININE 0.7 (L) 06/30/2022    GLUCOSE 207 (H) 06/28/2022    CALCIUM 9.0 06/28/2022    PROT 6.5 06/28/2022    LABALBU 4.1 06/28/2022    BILITOT 0.9 06/28/2022    ALKPHOS 55 06/28/2022    AST 37 06/28/2022    ALT 55 (H) 06/28/2022    LABGLOM >60 06/30/2022    GFRAA >60 06/30/2022    AGRATIO 1.7 01/24/2022     Lab Results   Component Value Date    CHOL 169 01/24/2022     Lab Results   Component Value Date    TRIG 191 (H) 01/24/2022     Lab Results   Component Value Date    HDL 40 01/24/2022     Lab Results   Component Value Date    LDLCALC 91 01/24/2022     Lab Results   Component Value Date    LABA1C 6.6 07/26/2022     Lab Results   Component Value Date    TSH 1.73 01/24/2022         BP (!) 140/87   Pulse 92   Wt (!) 417 lb (189.1 kg)   SpO2 95%   BMI 45.81 kg/m²     BP Readings from Last 3 Encounters:   10/26/22 (!) 140/87   07/26/22 122/70   07/21/22 118/72       Wt Readings from Last 3 Encounters:   10/26/22 (!) 417 lb (189.1 kg)   07/26/22 (!) 406 lb (184.2 kg)   07/14/22 (!) 399 lb 9.6 oz (181.3 kg)         Physical Exam  Constitutional:       General: He is not in acute distress. Appearance: Normal appearance. He is well-developed. He is obese. He is not diaphoretic. HENT:      Head: Normocephalic and atraumatic. Cardiovascular:      Rate and Rhythm: Normal rate and regular rhythm. Heart sounds: Normal heart sounds. No murmur heard. Pulmonary:      Effort: Pulmonary effort is normal.      Breath sounds: Normal breath sounds. No wheezing. Musculoskeletal:         General: Normal range of motion. Cervical back: Normal range of motion and neck supple. No rigidity. Right lower leg: No edema. Left lower leg: No edema. Skin:     Comments: Multiple pustular papules on the back of his head   Neurological:      General: No focal deficit present.       Mental Status: He is alert and oriented to person, place, and time. Cranial Nerves: No cranial nerve deficit. Psychiatric:         Mood and Affect: Mood normal.         Behavior: Behavior normal.       ASSESSMENT/ PLAN:    1. Type 2 diabetes mellitus without complication, without long-term current use of insulin ([facility])  -His A1c last time was done was 6.6 patient taking Lantus 35 units and he is on the Humalog 3 times a day as needed go with the sliding scale he also on metformin so we will keep the same medication  - [facility] (Ambulatory)  - metFORMIN (GLUCOPHAGE-XR) 500 MG extended release tablet; Take 2 tablets by mouth 2 times daily  Dispense: 360 tablet; Refill: 3  - insulin glargine (LANTUS SOLOSTAR) 100 UNIT/ML injection pen; Inject 35 Units into the skin nightly  Dispense: 15 Adjustable Dose Pre-filled Pen Syringe; Refill: 5  - HUMALOG KWIKPEN 100 UNIT/ML SOPN; DOSE  NO INSULIN 140-199 2 UNITS, 200-249 3 UNITS, 250-299 4 UNITS, 300-349 5 UNITS, 350-399 6 UNITS, 400 AND ABOVE 8 UNITS  Dispense: 15 Adjustable Dose Pre-filled Pen Syringe; Refill: 3    2. Elevated blood pressure reading  -We will bring him back in 1 month to check the blood pressure and we recommend to check a blood pressure at home to    3. Intertriginous candidiasis  -Between his toes which is getting better  - nystatin (MYCOSTATIN) 360478 UNIT/GM cream; Apply topically 2 times daily. Dispense: 60 g; Refill: 2    4. Folliculitis  -On the back of his head so we will give the antibiotic  - doxycycline hyclate (VIBRA-TABS) 100 MG tablet; Take 1 tablet by mouth 2 times daily for 10 days  Dispense: 20 tablet; Refill: 0    5. Class 3 severe obesity without serious comorbidity with body mass index (BMI) of 45.0 to 49.9 in adult, unspecified obesity type ([address]  -Courage weight loss and we did the diabetic education for him too            - All old blood work reviewed with the patient  - Appropriate prescription are addressed.   - After visit thierry provided. - Questions answered and patient verbalizes understanding.  - Call for any problem, questions, or concerns.  - RTC if symptoms worse. Return in about 3 months (around 1/26/2023).

## 2022-10-27 PROBLEM — R03.0 ELEVATED BLOOD PRESSURE READING: Status: ACTIVE | Noted: 2022-10-27

## 2022-10-27 ASSESSMENT — ENCOUNTER SYMPTOMS
COUGH: 0
BACK PAIN: 0
SHORTNESS OF BREATH: 0

## 2023-01-12 DIAGNOSIS — E11.9 TYPE 2 DIABETES MELLITUS WITHOUT COMPLICATION, WITHOUT LONG-TERM CURRENT USE OF INSULIN (HCC): ICD-10-CM

## 2023-01-12 DIAGNOSIS — L73.9 FOLLICULITIS: ICD-10-CM

## 2023-01-16 RX ORDER — BLOOD SUGAR DIAGNOSTIC
STRIP MISCELLANEOUS
Qty: 100 EACH | Refills: 3 | Status: SHIPPED | OUTPATIENT
Start: 2023-01-16

## 2023-01-16 RX ORDER — DOXYCYCLINE HYCLATE 100 MG
TABLET ORAL
Qty: 20 TABLET | Refills: 0 | OUTPATIENT
Start: 2023-01-16

## 2023-01-26 ENCOUNTER — OFFICE VISIT (OUTPATIENT)
Dept: FAMILY MEDICINE CLINIC | Age: 38
End: 2023-01-26
Payer: COMMERCIAL

## 2023-01-26 VITALS
DIASTOLIC BLOOD PRESSURE: 88 MMHG | SYSTOLIC BLOOD PRESSURE: 138 MMHG | WEIGHT: 315 LBS | OXYGEN SATURATION: 98 % | BODY MASS INDEX: 36.45 KG/M2 | HEART RATE: 83 BPM | HEIGHT: 78 IN

## 2023-01-26 DIAGNOSIS — R06.83 SNORING: ICD-10-CM

## 2023-01-26 DIAGNOSIS — E11.9 TYPE 2 DIABETES MELLITUS WITHOUT COMPLICATION, WITHOUT LONG-TERM CURRENT USE OF INSULIN (HCC): Primary | ICD-10-CM

## 2023-01-26 DIAGNOSIS — L73.9 FOLLICULITIS: ICD-10-CM

## 2023-01-26 DIAGNOSIS — E11.9 TYPE 2 DIABETES MELLITUS WITHOUT COMPLICATION, WITHOUT LONG-TERM CURRENT USE OF INSULIN (HCC): ICD-10-CM

## 2023-01-26 LAB
A/G RATIO: 1.9 (ref 1.1–2.2)
ALBUMIN SERPL-MCNC: 4.3 G/DL (ref 3.4–5)
ALP BLD-CCNC: 53 U/L (ref 40–129)
ALT SERPL-CCNC: 31 U/L (ref 10–40)
ANION GAP SERPL CALCULATED.3IONS-SCNC: 11 MMOL/L (ref 3–16)
AST SERPL-CCNC: 19 U/L (ref 15–37)
BILIRUB SERPL-MCNC: 0.9 MG/DL (ref 0–1)
BUN BLDV-MCNC: 11 MG/DL (ref 7–20)
CALCIUM SERPL-MCNC: 9.4 MG/DL (ref 8.3–10.6)
CHLORIDE BLD-SCNC: 105 MMOL/L (ref 99–110)
CO2: 26 MMOL/L (ref 21–32)
CREAT SERPL-MCNC: 0.8 MG/DL (ref 0.9–1.3)
GFR SERPL CREATININE-BSD FRML MDRD: >60 ML/MIN/{1.73_M2}
GLUCOSE FASTING: 117 MG/DL (ref 70–99)
HBA1C MFR BLD: 5.7 %
POTASSIUM SERPL-SCNC: 4.5 MMOL/L (ref 3.5–5.1)
SODIUM BLD-SCNC: 142 MMOL/L (ref 136–145)
TOTAL PROTEIN: 6.6 G/DL (ref 6.4–8.2)

## 2023-01-26 PROCEDURE — G8417 CALC BMI ABV UP PARAM F/U: HCPCS | Performed by: FAMILY MEDICINE

## 2023-01-26 PROCEDURE — 2022F DILAT RTA XM EVC RTNOPTHY: CPT | Performed by: FAMILY MEDICINE

## 2023-01-26 PROCEDURE — 83036 HEMOGLOBIN GLYCOSYLATED A1C: CPT | Performed by: FAMILY MEDICINE

## 2023-01-26 PROCEDURE — G8484 FLU IMMUNIZE NO ADMIN: HCPCS | Performed by: FAMILY MEDICINE

## 2023-01-26 PROCEDURE — 3044F HG A1C LEVEL LT 7.0%: CPT | Performed by: FAMILY MEDICINE

## 2023-01-26 PROCEDURE — 1036F TOBACCO NON-USER: CPT | Performed by: FAMILY MEDICINE

## 2023-01-26 PROCEDURE — G8427 DOCREV CUR MEDS BY ELIG CLIN: HCPCS | Performed by: FAMILY MEDICINE

## 2023-01-26 PROCEDURE — 99214 OFFICE O/P EST MOD 30 MIN: CPT | Performed by: FAMILY MEDICINE

## 2023-01-26 RX ORDER — DOXYCYCLINE HYCLATE 100 MG
100 TABLET ORAL 2 TIMES DAILY
Qty: 20 TABLET | Refills: 0 | Status: SHIPPED | OUTPATIENT
Start: 2023-01-26 | End: 2023-02-05

## 2023-01-26 RX ORDER — ACETAMINOPHEN 500 MG
500 TABLET ORAL 4 TIMES DAILY PRN
Qty: 120 TABLET | Refills: 2 | Status: SHIPPED | OUTPATIENT
Start: 2023-01-26

## 2023-01-26 ASSESSMENT — PATIENT HEALTH QUESTIONNAIRE - PHQ9
SUM OF ALL RESPONSES TO PHQ QUESTIONS 1-9: 2
7. TROUBLE CONCENTRATING ON THINGS, SUCH AS READING THE NEWSPAPER OR WATCHING TELEVISION: 0
1. LITTLE INTEREST OR PLEASURE IN DOING THINGS: 0
5. POOR APPETITE OR OVEREATING: 0
3. TROUBLE FALLING OR STAYING ASLEEP: 0
DEPRESSION UNABLE TO ASSESS: FUNCTIONAL CAPACITY MOTIVATION LIMITS ACCURACY
SUM OF ALL RESPONSES TO PHQ QUESTIONS 1-9: 2
SUM OF ALL RESPONSES TO PHQ9 QUESTIONS 1 & 2: 0
SUM OF ALL RESPONSES TO PHQ QUESTIONS 1-9: 2
2. FEELING DOWN, DEPRESSED OR HOPELESS: 0
8. MOVING OR SPEAKING SO SLOWLY THAT OTHER PEOPLE COULD HAVE NOTICED. OR THE OPPOSITE, BEING SO FIGETY OR RESTLESS THAT YOU HAVE BEEN MOVING AROUND A LOT MORE THAN USUAL: 0
SUM OF ALL RESPONSES TO PHQ QUESTIONS 1-9: 2
4. FEELING TIRED OR HAVING LITTLE ENERGY: 2
10. IF YOU CHECKED OFF ANY PROBLEMS, HOW DIFFICULT HAVE THESE PROBLEMS MADE IT FOR YOU TO DO YOUR WORK, TAKE CARE OF THINGS AT HOME, OR GET ALONG WITH OTHER PEOPLE: 0
9. THOUGHTS THAT YOU WOULD BE BETTER OFF DEAD, OR OF HURTING YOURSELF: 0
6. FEELING BAD ABOUT YOURSELF - OR THAT YOU ARE A FAILURE OR HAVE LET YOURSELF OR YOUR FAMILY DOWN: 0

## 2023-01-26 ASSESSMENT — ENCOUNTER SYMPTOMS
SHORTNESS OF BREATH: 0
ABDOMINAL PAIN: 0
BACK PAIN: 0
COUGH: 0

## 2023-01-26 NOTE — PROGRESS NOTES
Deepti Reeves  1985  01/26/23    Chief Complaint   Patient presents with    3 Month Follow-Up    Diabetes    Other     rash           Patient here for a 3 months follow-up regarding his diabetes, he is taking Lantus 36units at night and he is on Humalog 3 times a day but very rare to take even 1 dose, the rash on the back of his head, doxycyclin was given, did help and came back. No past medical history on file.   Past Surgical History:   Procedure Laterality Date    TOE AMPUTATION Right 6/29/2022    TOE AMPUTATION RIGHT 2ND TOE performed by Delmar Velasquez MD at [facility] OR     Family History   Problem Relation Age of Onset    Diabetes Maternal Grandmother      Social History     Socioeconomic History    Marital status: Single     Spouse name: Not on file    Number of children: Not on file    Years of education: Not on file    Highest education level: Not on file   Occupational History    Not on file   Tobacco Use    Smoking status: Never    Smokeless tobacco: Never   Substance and Sexual Activity    Alcohol use: Never    Drug use: Never    Sexual activity: Not on file   Other Topics Concern    Not on file   Social History Narrative    Not on file     Social Determinants of Health     Financial Resource Strain: Unknown    Difficulty of Paying Living Expenses: Patient refused   Food Insecurity: Unknown    Worried About Running Out of Food in the Last Year: Patient refused    Ran Out of Food in the Last Year: Patient refused   Transportation Needs: Not on file   Physical Activity: Not on file   Stress: Not on file   Social Connections: Not on file   Intimate Partner Violence: Not on file   Housing Stability: Not on file       Allergies   Allergen Reactions    Bactrim [Sulfamethoxazole-Trimethoprim] Rash     Current Outpatient Medications   Medication Sig Dispense Refill    doxycycline hyclate (VIBRA-TABS) 100 MG tablet Take 1 tablet by mouth 2 times daily for 10 days 20 tablet 0    acetaminophen (TYLENOL) 500 MG tablet Take 1 tablet by mouth 4 times daily as needed for Pain 120 tablet 2    blood glucose test strips (ONETOUCH ULTRA) strip USE  STRIP TO CHECK GLUCOSE 2 TO 3 TIMES DAILY AS NEEDED 100 each 3    metFORMIN (GLUCOPHAGE-XR) 500 MG extended release tablet Take 2 tablets by mouth 2 times daily 360 tablet 3    insulin glargine (LANTUS SOLOSTAR) 100 UNIT/ML injection pen Inject 35 Units into the skin nightly 15 Adjustable Dose Pre-filled Pen Syringe 5    HUMALOG KWIKPEN 100 UNIT/ML SOPN DOSE  NO INSULIN 140-199 2 UNITS, 200-249 3 UNITS, 250-299 4 UNITS, 300-349 5 UNITS, 350-399 6 UNITS, 400 AND ABOVE 8 UNITS 15 Adjustable Dose Pre-filled Pen Syringe 3    blood glucose monitor kit and supplies Use 3-4 times daily 1 kit 0    Lancets MISC Use one lancet 2  times daily 50 each 5    nystatin (MYCOSTATIN) 456014 UNIT/GM cream Apply topically 2 times daily. (Patient not taking: Reported on 1/26/2023) 60 g 2     No current facility-administered medications for this visit. Review of Systems   Constitutional:  Negative for activity change, appetite change, chills, fatigue and fever. HENT:  Negative for congestion. Respiratory:  Negative for cough and shortness of breath. Cardiovascular:  Negative for chest pain and leg swelling. Gastrointestinal:  Negative for abdominal pain. Genitourinary:  Negative for dysuria and frequency. Musculoskeletal:  Negative for back pain and gait problem. Skin:  Positive for rash. Neurological:  Negative for dizziness, weakness and headaches. Psychiatric/Behavioral:  Negative for agitation and behavioral problems. The patient is not nervous/anxious.       Lab Results   Component Value Date    WBC 5.1 06/28/2022    HGB 12.7 (L) 06/28/2022    HCT 40.5 (L) 06/28/2022    MCV 90.2 06/28/2022     06/28/2022     Lab Results   Component Value Date     06/28/2022    K 4.3 06/28/2022     06/28/2022    CO2 29 06/28/2022    BUN 8 06/28/2022    CREATININE 0.7 (L) 06/30/2022    GLUCOSE 207 (H) 06/28/2022    CALCIUM 9.0 06/28/2022    PROT 6.5 06/28/2022    LABALBU 4.1 06/28/2022    BILITOT 0.9 06/28/2022    ALKPHOS 55 06/28/2022    AST 37 06/28/2022    ALT 55 (H) 06/28/2022    LABGLOM >60 06/30/2022    GFRAA >60 06/30/2022    AGRATIO 1.7 01/24/2022     Lab Results   Component Value Date    CHOL 169 01/24/2022     Lab Results   Component Value Date    TRIG 191 (H) 01/24/2022     Lab Results   Component Value Date    HDL 40 01/24/2022     Lab Results   Component Value Date    LDLCALC 91 01/24/2022     Lab Results   Component Value Date    LABA1C 5.7 01/26/2023     Lab Results   Component Value Date    TSH 1.73 01/24/2022         /88 (Site: Left Upper Arm, Position: Sitting, Cuff Size: Large Adult)   Pulse 83   Ht 6' 8\" (2.032 m)   Wt (!) 422 lb 6.4 oz (191.6 kg)   SpO2 98%   BMI 46.40 kg/m²     BP Readings from Last 3 Encounters:   01/26/23 138/88   10/26/22 (!) 140/87   07/26/22 122/70       Wt Readings from Last 3 Encounters:   01/26/23 (!) 422 lb 6.4 oz (191.6 kg)   10/26/22 (!) 417 lb (189.1 kg)   07/26/22 (!) 406 lb (184.2 kg)         Physical Exam  Constitutional:       General: He is not in acute distress. Appearance: Normal appearance. He is well-developed. He is obese. He is not diaphoretic. HENT:      Head: Normocephalic and atraumatic. Cardiovascular:      Rate and Rhythm: Normal rate and regular rhythm. Heart sounds: Normal heart sounds. No murmur heard. Pulmonary:      Effort: Pulmonary effort is normal.      Breath sounds: Normal breath sounds. No wheezing. Musculoskeletal:         General: Normal range of motion. Cervical back: Normal range of motion and neck supple. No rigidity. Right lower leg: No edema. Left lower leg: No edema. Skin:     Comments: Multiple pustular papules on the back of his head   Neurological:      General: No focal deficit present.       Mental Status: He is alert and oriented to person, place, and time.      Cranial Nerves: No cranial nerve deficit. Psychiatric:         Mood and Affect: Mood normal.         Behavior: Behavior normal.       ASSESSMENT/ PLAN:    1. Type 2 diabetes mellitus without complication, without long-term current use of insulin (HCC)  - doing good, on lantus 36 units, and humolog very rare take it, and he is on metfromin  - POCT glycosylated hemoglobin (Hb A1C)  - Comprehensive Metabolic Panel, Fasting; Future    2. Snoring  - Ambulatory referral to Sleep Medicine    3. Folliculitis  - avoid shaving  - doxycycline hyclate (VIBRA-TABS) 100 MG tablet; Take 1 tablet by mouth 2 times daily for 10 days  Dispense: 20 tablet; Refill: 0  - acetaminophen (TYLENOL) 500 MG tablet; Take 1 tablet by mouth 4 times daily as needed for Pain  Dispense: 120 tablet; Refill: 2              - All old blood work reviewed with the patient  - Appropriate prescription are addressed. - After visit summery provided. - Questions answered and patient verbalizes understanding.  - Call for any problem, questions, or concerns.  - RTC if symptoms worse. Return in about 6 months (around 7/26/2023).

## (undated) DEVICE — TRAY PREP DRY W/ PREM GLV 2 APPL 6 SPNG 2 UNDPD 1 OVERWRAP

## (undated) DEVICE — WET SKIN SCRUB PREP TRAY: Brand: KENDALL

## (undated) DEVICE — YANKAUER,FLEXIBLE HANDLE,REGLR CAPACITY: Brand: MEDLINE INDUSTRIES, INC.

## (undated) DEVICE — SPONGE GZ W4XL8IN COT WVN 12 PLY

## (undated) DEVICE — INTENDED FOR TISSUE SEPARATION, AND OTHER PROCEDURES THAT REQUIRE A SHARP SURGICAL BLADE TO PUNCTURE OR CUT.: Brand: BARD-PARKER ® STAINLESS STEEL BLADES

## (undated) DEVICE — SUTURE VCRL SZ 3-0 L18IN ABSRB UD L26MM SH 1/2 CIR J864D

## (undated) DEVICE — NEEDLE HYPO 23GA L1.5IN TURQ POLYPR HUB S STL THN WALL IM

## (undated) DEVICE — SUTURE NONABSORBABLE MONOFILAMENT 4-0 FS-2 18 IN ETHILON 662H

## (undated) DEVICE — COUNTER NDL 30 COUNT FOAM STRP SGL MAG

## (undated) DEVICE — SHEET,DRAPE,53X77,STERILE: Brand: MEDLINE

## (undated) DEVICE — SYRINGE MED 10ML LUERLOCK TIP W/O SFTY DISP

## (undated) DEVICE — SPONGE LAP W18XL18IN WHT COT 4 PLY FLD STRUNG RADPQ DISP ST

## (undated) DEVICE — GLOVE SURG SZ 6 THK91MIL LTX FREE SYN POLYISOPRENE ANTI

## (undated) DEVICE — DRESSING,GAUZE,XEROFORM,CURAD,1"X8",ST: Brand: CURAD

## (undated) DEVICE — ELECTRODE ES AD CRDLSS PT RET REM POLYHESIVE

## (undated) DEVICE — TUBING, SUCTION, 9/32" X 20', STRAIGHT: Brand: MEDLINE INDUSTRIES, INC.

## (undated) DEVICE — SUTURE VCRL SZ 3-0 L27IN ABSRB UD L26MM CT-2 1/2 CIR J232H

## (undated) DEVICE — GOWN,SIRUS,POLYRNF,BRTHSLV,XLN/XL,20/CS: Brand: MEDLINE

## (undated) DEVICE — DRAPE,EXTREMITY,89X128,STERILE: Brand: MEDLINE

## (undated) DEVICE — BANDAGE,GAUZE,BULKEE II,4.5"X4.1YD,STRL: Brand: MEDLINE

## (undated) DEVICE — PENCIL ES CRD L10FT HND SWCHING ROCK SWCH W/ EDGE COAT BLDE

## (undated) DEVICE — TOWEL,OR,DSP,ST,BLUE,STD,6/PK,12PK/CS: Brand: MEDLINE

## (undated) DEVICE — MARKER SURG SKIN UTIL REGULAR/FINE 2 TIP W/ RUL AND 9 LBL

## (undated) DEVICE — SUTURE VCRL SZ 2-0 L27IN ABSRB VLT L26MM SH 1/2 CIR J317H

## (undated) DEVICE — GLOVE ORANGE PI 7   MSG9070

## (undated) DEVICE — PACK,BASIC,SIRUS,V: Brand: MEDLINE

## (undated) DEVICE — GLOVE ORANGE PI 7 1/2   MSG9075

## (undated) DEVICE — SUTURE ETHLN SZ 4-0 L18IN NONABSORBABLE BLK L19MM FS-2 3/8 662G